# Patient Record
Sex: FEMALE | Race: ASIAN | NOT HISPANIC OR LATINO | Employment: FULL TIME | URBAN - METROPOLITAN AREA
[De-identification: names, ages, dates, MRNs, and addresses within clinical notes are randomized per-mention and may not be internally consistent; named-entity substitution may affect disease eponyms.]

---

## 2017-03-24 ENCOUNTER — OFFICE VISIT (OUTPATIENT)
Dept: CARDIOLOGY | Facility: CLINIC | Age: 37
End: 2017-03-24

## 2017-03-24 VITALS
BODY MASS INDEX: 25.8 KG/M2 | DIASTOLIC BLOOD PRESSURE: 90 MMHG | HEIGHT: 62 IN | HEART RATE: 69 BPM | WEIGHT: 140.2 LBS | SYSTOLIC BLOOD PRESSURE: 128 MMHG

## 2017-03-24 DIAGNOSIS — I49.3 PVC (PREMATURE VENTRICULAR CONTRACTION): ICD-10-CM

## 2017-03-24 DIAGNOSIS — I11.9 HYPERTENSIVE HEART DISEASE WITHOUT HEART FAILURE: Primary | ICD-10-CM

## 2017-03-24 DIAGNOSIS — E78.5 DYSLIPIDEMIA: ICD-10-CM

## 2017-03-24 PROCEDURE — 99213 OFFICE O/P EST LOW 20 MIN: CPT | Performed by: INTERNAL MEDICINE

## 2017-03-24 RX ORDER — LISINOPRIL 5 MG/1
5 TABLET ORAL DAILY
Qty: 90 TABLET | Refills: 3 | Status: SHIPPED | OUTPATIENT
Start: 2017-03-24 | End: 2018-04-16 | Stop reason: SDUPTHER

## 2017-03-24 RX ORDER — ROSUVASTATIN CALCIUM 10 MG/1
10 TABLET, COATED ORAL DAILY
COMMUNITY
End: 2017-10-11

## 2017-03-24 RX ORDER — FLECAINIDE ACETATE 100 MG/1
100 TABLET ORAL 2 TIMES DAILY
Qty: 180 TABLET | Refills: 3 | Status: SHIPPED | OUTPATIENT
Start: 2017-03-24 | End: 2018-03-27 | Stop reason: SDUPTHER

## 2017-03-24 NOTE — PROGRESS NOTES
Encounter Date:03/24/2017      Patient ID: Mitulbuivis Joshua is a 37 y.o. female.    Chief Complaint: PVC's; Hypertensive heart disease; and Hyperlipidemia    PROBLEM LIST:  1. Premature ventricular contractions:   a. History of palpitations with cardiac evaluation during pregnancy in 2001 or 2002 while in [facility]. Reportedly, she was seen by some electrophysiologist and ablation procedure was recommended, but declined by patient. Termination of pregnancy was also recommended, but declined by patient. She subsequently had a normal full-term pregnancy and delivery.   b. Hospitalized at [facility], 10/17/2013, with 1-2 week history of dizziness and near syncope, noted to have runs of wide QRS complex tachycardia felt to be left ventricular outflow tract VT.   c. After trial of beta blockers and Cardizem as well as lidocaine, she was discharged on flecainide.   d. Echocardiogram during her pregnancy in 2001 or 2002 was reportedly unremarkable.   e. Echocardiogram at [facility], 10/18/2013, showed normal left ventricular size and function. Ejection fraction 55% to 60%. No significant valvular abnormalities.   f. Beta blockers added by her cardiologist while she was in [place].   g. Echocardiogram, 02/22/2016, showed normal LV systolic function with ejection fraction greater than 65%. Trace mitral and trace tricuspid regurgitation.  h. A 24-hour Holter 01/29/2016 showed sinus rhythm with very frequent PVCs (12% of QRS complexes).  i. Treadmill stress test, 02/22/2016: Remarkable for average exercise capacity, normal hemodynamic response. Negative test for angina or ischemic ST segment changes. No exercise-induced arrhythmias although frequent PVCs were noted at rest which improved with exercise.  j. Ep study  With identification  of 2 PVCs (posterior Left ventricular wall blow mitral valve annulus- associated with NSVT, successfully ablated) second PVC felt to be deep seated and would  "potentially require epicardial ablation.   k. 24-hour Holter September 21., 2016 showed frequent PVCs (4.9%) plaque copy  2. Anxiety and panic disorder.   3. Chronic back pain.   4. \"Hypertensive heart disease\" as noted in her records. No documentation available.   5. Family history of coronary artery disease, father has had stents.   6. Dyslipidemia.   7. Glucose intolerance.     History of Present Illness   Patient is a pleasant lady returns today for follow-up accompanied by relative. Recently him back from [place], notes that she experiences occasional palpitations before starting her medications these are brief rare and self-limiting.Overall stable from cardiac standpoint. Inquires whether she will be able to handle a pregnancy from a medical standpoint. Denies chest pain, shortness of breath, palpitations and syncope. Continues to remain busy and active but shows concern as she does not know what her limits are.    No Known Allergies      Current Outpatient Prescriptions:   •  aspirin 81 MG EC tablet, Take 81 mg by mouth daily., Disp: , Rfl:   •  cholecalciferol (VITAMIN D3) 24778 UNITS capsule, Take 50,000 Units by mouth 1 (one) time per week., Disp: , Rfl:   •  flecainide (TAMBOCOR) 100 MG tablet, Take 1 tablet by mouth 2 (two) times a day., Disp: 180 tablet, Rfl: 2  •  lisinopril (PRINIVIL,ZESTRIL) 5 MG tablet, Take 1 tablet by mouth daily., Disp: 90 tablet, Rfl: 2  •  rosuvastatin (CRESTOR) 10 MG tablet, Take 10 mg by mouth Daily., Disp: , Rfl:     The following portions of the patient's history were reviewed and updated as appropriate: allergies, current medications, past family history, past medical history, past social history, past surgical history and problem list.    Review of Systems   Constitution: Negative for chills, fever, weight gain and weight loss.   Cardiovascular: Negative for chest pain, claudication, dyspnea on exertion, leg swelling, orthopnea, palpitations, paroxysmal nocturnal dyspnea " "and syncope.        No dizziness   Gastrointestinal: Negative for abdominal pain, constipation, diarrhea, nausea and vomiting.   Genitourinary:        No urinary symptoms   Neurological:        No symptoms of stroke.   All other systems reviewed and are negative.    Objective:     Blood pressure 128/90, pulse 69, height 62\" (157.5 cm), weight 140 lb 3.2 oz (63.6 kg).      Physical Exam   Constitutional: She appears well-developed and well-nourished.   HENT:   HEENT exam unremarkable.   Neck: Neck supple. No JVD present.   No carotid bruits.   Cardiovascular: Normal rate, regular rhythm and normal heart sounds.    No murmur heard.  2 plus symmetric pulses.   Pulmonary/Chest: Breath sounds normal. She exhibits no tenderness.   Abdominal:   Abdomen benign.   Musculoskeletal: She exhibits no edema.   Neurological:   Neurological exam unremarkable.   Vitals reviewed.    Procedures       Assessment:      Diagnosis Plan   1. Hypertensive heart disease without heart failure     2. PVC (premature ventricular contraction)     3. Dyslipidemia       Plan:   Overall stable from a cardiac standpoint.  Regarding her pregnancy planning I have advised her to discuss this further with her PCP and OB/GYN, there is no obvious contraindication from cardiac standpoint however this may necessitate discontinuation of cardiac and blood pressure medications with subsequent recurrence of symptoms and complications.    Continue current medications.   FU in 12 MO, sooner as needed.  Thank you for allowing us to participate in the care of your patient.     Scribed for Joesph Flower MD by Dudley Zhou. 3/24/2017  12:54 PM    Joseph GAUTAM MD, personally performed the services described in this documentation as scribed by the above named individual in my presence, and it is both accurate and complete.  3/24/2017  3:50 PM        Please note that portions of this note may have been completed with a voice recognition program. Efforts were made to " edit the dictations, but occasionally words are mistranscribed.

## 2017-04-05 ENCOUNTER — OFFICE VISIT (OUTPATIENT)
Dept: CARDIOLOGY | Facility: CLINIC | Age: 37
End: 2017-04-05

## 2017-04-05 VITALS
HEART RATE: 72 BPM | SYSTOLIC BLOOD PRESSURE: 122 MMHG | HEIGHT: 62 IN | BODY MASS INDEX: 26.06 KG/M2 | DIASTOLIC BLOOD PRESSURE: 78 MMHG | WEIGHT: 141.6 LBS

## 2017-04-05 DIAGNOSIS — I11.9 HYPERTENSIVE HEART DISEASE WITHOUT HEART FAILURE: ICD-10-CM

## 2017-04-05 DIAGNOSIS — I49.3 PVC (PREMATURE VENTRICULAR CONTRACTION): Primary | ICD-10-CM

## 2017-04-05 PROCEDURE — 99213 OFFICE O/P EST LOW 20 MIN: CPT | Performed by: INTERNAL MEDICINE

## 2017-04-05 NOTE — PROGRESS NOTES
Khushbukumari Josiane  1980  [phone]      04/05/2017    [facility] CARDIOLOGY     Bashir Das MD  [address] [place] [address]    Chief Complaint   Patient presents with   • Palpitations       Problem List:   PROBLEM LIST:  1. Premature ventricular contractions:   a. History of palpitations with cardiac evaluation during pregnancy in 2001 or 2002 while in [facility]. Reportedly, she was seen by some electrophysiologist and ablation procedure was recommended, but declined by patient. Termination of pregnancy was also recommended, but declined by patient. She subsequently had a normal full-term pregnancy and delivery.   b. Hospitalized at [facility], 10/17/2013, with 1-2 week history of dizziness and near syncope, noted to have runs of wide QRS complex tachycardia felt to be left ventricular outflow tract VT.   c. After trial of beta blockers and Cardizem as well as lidocaine, she was discharged on flecainide.   d. Echocardiogram during her pregnancy in 2001 or 2002 was reportedly unremarkable.   e. Echocardiogram at [facility], 10/18/2013, showed normal left ventricular size and function. Ejection fraction 55% to 60%. No significant valvular abnormalities.   f. Beta blockers added by her cardiologist while she was in [place].   g. Echocardiogram, 02/22/2016, showed normal LV systolic function with ejection fraction greater than 65%. Trace mitral and trace tricuspid regurgitation.  h. A 24-hour Holter 01/29/2016 showed sinus rhythm with very frequent PVCs (12% of QRS complexes).  i. Treadmill stress test, 02/22/2016: Remarkable for average exercise capacity, normal hemodynamic response. Negative test for angina or ischemic ST segment changes. No exercise-induced arrhythmias although frequent PVCs were noted at rest which improved with exercise.  j. Ep study With identification  of 2 PVCs (posterior Left ventricular wall blow mitral valve  "annulus- associated with NSVT, successfully ablated) second PVC felt to be deep seated and would potentially require epicardial ablation.   2. Anxiety and panic disorder.   3. Chronic back pain.   4. \"Hypertensive heart disease\" as noted in her records. No documentation available.   5. Family history of coronary artery disease, father has had stents.   6. Dyslipidemia.   7. Glucose intolerance.   Allergies  No Known Allergies    Current Medications    Current Outpatient Prescriptions:   •  aspirin 81 MG EC tablet, Take 81 mg by mouth daily., Disp: , Rfl:   •  cholecalciferol (VITAMIN D3) 28137 UNITS capsule, Take 50,000 Units by mouth 1 (one) time per week., Disp: , Rfl:   •  flecainide (TAMBOCOR) 100 MG tablet, Take 1 tablet by mouth 2 (Two) Times a Day., Disp: 180 tablet, Rfl: 3  •  lisinopril (PRINIVIL,ZESTRIL) 5 MG tablet, Take 1 tablet by mouth Daily., Disp: 90 tablet, Rfl: 3  •  rosuvastatin (CRESTOR) 10 MG tablet, Take 10 mg by mouth Daily., Disp: , Rfl:     History of Present Illness   HPI    Pt presents for follow up of PVC's . Since we last saw the pt, pt denies any palps, SOB, CP, LH, and dizziness. Denies any hospitalizations, ER visits, bleeding, or TIA/CVA symptoms. Overall feels well. They are thinking about getting pregnant, not sure however. Saw Dr Flower last month with NL EKG    ROS:  General:  Denies fatigue, weight gain or loss  Cardiovascular:  Denies CP, PND, syncope, near syncope, edema or palpitations.  Pulmonary:  Denies HOLLAND, cough, or wheezing      Vitals:    04/05/17 1449   BP: 122/78   BP Location: Right arm   Patient Position: Sitting   Pulse: 72   Weight: 141 lb 9.6 oz (64.2 kg)   Height: 62\" (157.5 cm)     PE:  General: NAD  Neck: no JVD, no carotid bruits, no TM  Heart RRR, NL S1, S2, S4 present, no rubs, murmurs  Lungs: CTA, no wheezes, rhonchi, or rales  Abd: soft, non-tender, NL BS  Ext: No musculoskeletal deformities, no edema, cyanosis, or clubbing  Psych: normal mood and " affect    Diagnostic Data:  Procedures    1. PVC (premature ventricular contraction)    2. Hypertensive heart disease without heart failure          Plan:  1)PVCs s/p RFA well controlled on meds: pt to call us if she plans to get pregnant so to adyust her meds.  Continue present medications.   2) HTN  Wt loss, exercise, salt reduction    F/up in 6 months

## 2017-05-11 ENCOUNTER — OFFICE VISIT (OUTPATIENT)
Dept: CARDIOLOGY | Facility: CLINIC | Age: 37
End: 2017-05-11

## 2017-05-11 VITALS
SYSTOLIC BLOOD PRESSURE: 104 MMHG | HEART RATE: 94 BPM | HEIGHT: 62 IN | DIASTOLIC BLOOD PRESSURE: 72 MMHG | WEIGHT: 140 LBS | BODY MASS INDEX: 25.76 KG/M2

## 2017-05-11 DIAGNOSIS — I49.3 PVC (PREMATURE VENTRICULAR CONTRACTION): Primary | ICD-10-CM

## 2017-05-11 DIAGNOSIS — E78.5 DYSLIPIDEMIA: ICD-10-CM

## 2017-05-11 DIAGNOSIS — I11.9 HYPERTENSIVE HEART DISEASE WITHOUT HEART FAILURE: ICD-10-CM

## 2017-05-11 PROCEDURE — 93000 ELECTROCARDIOGRAM COMPLETE: CPT | Performed by: INTERNAL MEDICINE

## 2017-05-11 PROCEDURE — 99213 OFFICE O/P EST LOW 20 MIN: CPT | Performed by: INTERNAL MEDICINE

## 2017-05-11 RX ORDER — ALPRAZOLAM 0.25 MG/1
0.25 TABLET ORAL 2 TIMES DAILY PRN
COMMUNITY
End: 2017-10-11

## 2017-10-11 ENCOUNTER — OFFICE VISIT (OUTPATIENT)
Dept: CARDIOLOGY | Facility: CLINIC | Age: 37
End: 2017-10-11

## 2017-10-11 VITALS
HEIGHT: 62 IN | BODY MASS INDEX: 26.35 KG/M2 | DIASTOLIC BLOOD PRESSURE: 93 MMHG | WEIGHT: 143.2 LBS | SYSTOLIC BLOOD PRESSURE: 138 MMHG | HEART RATE: 71 BPM

## 2017-10-11 DIAGNOSIS — I11.9 HYPERTENSIVE HEART DISEASE WITHOUT HEART FAILURE: ICD-10-CM

## 2017-10-11 DIAGNOSIS — I49.3 PVC (PREMATURE VENTRICULAR CONTRACTION): Primary | ICD-10-CM

## 2017-10-11 PROCEDURE — 93000 ELECTROCARDIOGRAM COMPLETE: CPT | Performed by: PHYSICIAN ASSISTANT

## 2017-10-11 PROCEDURE — 99213 OFFICE O/P EST LOW 20 MIN: CPT | Performed by: PHYSICIAN ASSISTANT

## 2017-10-11 NOTE — PROGRESS NOTES
Khushbukumari Josiane  1980  [phone]      10/11/2017    [facility] CARDIOLOGY     Bashir Das MD  [address] [place] [address]    Chief Complaint   Patient presents with   • Palpitations       PROBLEM LIST:  1. Premature ventricular contractions:   a. History of palpitations with cardiac evaluation during pregnancy in 2001 or 2002 while in [place]. Reportedly, she was seen by some electrophysiologist and ablation procedure was recommended, but declined by patient. Termination of pregnancy was also recommended, but declined by patient. She subsequently had a normal full-term pregnancy and delivery.   b. Hospitalized at [facility], 10/17/2013, with 1-2 week history of dizziness and near syncope, noted to have runs of wide QRS complex tachycardia felt to be left ventricular outflow tract VT.   c. After trial of beta blockers and Cardizem as well as lidocaine, she was discharged on flecainide.   d. Echocardiogram during her pregnancy in 2001 or 2002 was reportedly unremarkable.   e. Echocardiogram at [facility], 10/18/2013, showed normal left ventricular size and function. Ejection fraction 55% to 60%. No significant valvular abnormalities.   f. Beta blockers added by her cardiologist while she was in [place].   g. Echocardiogram, 02/22/2016, showed normal LV systolic function with ejection fraction greater than 65%. Trace mitral and trace tricuspid regurgitation.  h. A 24-hour Holter 01/29/2016 showed sinus rhythm with very frequent PVCs (12% of QRS complexes).  i. Treadmill stress test, 02/22/2016: Remarkable for average exercise capacity, normal hemodynamic response. Negative test for angina or ischemic ST segment changes. No exercise-induced arrhythmias although frequent PVCs were noted at rest which improved with exercise.  j. Ep study With identification  of 2 PVCs (posterior Left ventricular wall blow mitral valve annulus- associated  "with NSVT, successfully ablated) second PVC felt to be deep seated and would potentially require epicardial ablation.   2. Anxiety and panic disorder.   3. Chronic back pain.   4. \"Hypertensive heart disease\" as noted in her records. No documentation available.   5. Family history of coronary artery disease, father has had stents.   6. Dyslipidemia.   7. Glucose intolerance.     Allergies  No Known Allergies    Current Medications    Current Outpatient Prescriptions:   •  aspirin 81 MG EC tablet, Take 81 mg by mouth daily., Disp: , Rfl:   •  flecainide (TAMBOCOR) 100 MG tablet, Take 1 tablet by mouth 2 (Two) Times a Day., Disp: 180 tablet, Rfl: 3  •  lisinopril (PRINIVIL,ZESTRIL) 5 MG tablet, Take 1 tablet by mouth Daily., Disp: 90 tablet, Rfl: 3    History of Present Illness   HPI    Pt presents for follow up of PVCs . Since we last saw the pt, she had an episode of tachycardia and went to the ER in [place] and was told she had anxiety. She states it was similar to what she felt prior to her ablation. She was given Xanax while there. Since then, she has been doing well. Her HRs have been normal since then, her BPs have been good. She denies CP, SOB, syncope, and palpitations.     ROS:  General:  Denies fatigue, weight gain or loss  Cardiovascular:  Denies CP, PND, syncope, near syncope, edema + palpitations.  Pulmonary:  Denies HOLLAND, cough, or wheezing      Vitals:    10/11/17 1449   BP: 138/93   BP Location: Left arm   Patient Position: Sitting   Pulse: 71   Weight: 143 lb 3.2 oz (65 kg)   Height: 62\" (157.5 cm)     PE:  General: NAD A & O x 3  Neck: no JVD, no carotid bruits, no TM  Heart RRR, NL S1, S2, S4 present, no rubs, + MR murmur  Lungs: CTA, no wheezes, rhonchi, or rales  Abd: soft, non-tender, NL BS  Ext: No musculoskeletal deformities, no edema, cyanosis, or clubbing  Psych: normal mood and affect    Diagnostic Data:        ECG 12 Lead  Date/Time: 10/11/2017 3:08 PM  Performed by: OG ABEL " M  Authorized by: OG ABEL   Rhythm: sinus rhythm  BPM: 71              1. PVC (premature ventricular contraction)    2. Hypertensive heart disease without heart failure          Plan:  1. PVCs- overall controlled on Flecainide. Had two episode of tachycardia several months ago, now resolved. If more frequent, consider event monitor. She can also get EKG for another episode.   2. HTN - controlled.     F/up in 6 months    Og Abel PA-C  [facility]  10/11/2017   3:27 PM  I saw this patient independently.

## 2018-02-09 ENCOUNTER — TELEPHONE (OUTPATIENT)
Dept: CARDIOLOGY | Facility: CLINIC | Age: 38
End: 2018-02-09

## 2018-02-09 NOTE — TELEPHONE ENCOUNTER
Patient states she has been experiencing increased anxiety at night, and she has noticed her BP was elevated at that time.  She reports systolic increase to 150 during anxiety episode.  She did not monitor HR.  Patient took a dose of PRN Xanax and extra 5mg Lisinopril and reports BP lowered.    Patient denies CP, SOA, palpitations.    I advised patient to see PCP for further management of her anxiety.  Continue to use PRN Xanax.  She may take Melatonin 3mg nightly to help her sleep.  Advised patient to keep a BP/HR log for one week and call me with results.  I advised patient that she may take an extra 5mg of lisinopril at night if systolic BP is greater than 180.    Patient verbalized understanding.

## 2018-02-16 NOTE — TELEPHONE ENCOUNTER
Patient called back in to report BP log.  BP in the 120-130/80's range.  She was started on Lexapro by her PCP, and given amitriptyline to help her sleep.    Patient reports she is doing better, and sleeping better.    She will call back if necessary.

## 2018-03-27 RX ORDER — FLECAINIDE ACETATE 100 MG/1
TABLET ORAL
Qty: 60 TABLET | Refills: 2 | Status: SHIPPED | OUTPATIENT
Start: 2018-03-27 | End: 2018-04-16 | Stop reason: SDUPTHER

## 2018-04-16 ENCOUNTER — OFFICE VISIT (OUTPATIENT)
Dept: CARDIOLOGY | Facility: CLINIC | Age: 38
End: 2018-04-16

## 2018-04-16 VITALS
HEART RATE: 80 BPM | HEIGHT: 62 IN | SYSTOLIC BLOOD PRESSURE: 140 MMHG | DIASTOLIC BLOOD PRESSURE: 98 MMHG | BODY MASS INDEX: 26.87 KG/M2 | WEIGHT: 146 LBS

## 2018-04-16 DIAGNOSIS — I49.3 PVC (PREMATURE VENTRICULAR CONTRACTION): Primary | ICD-10-CM

## 2018-04-16 DIAGNOSIS — E78.5 DYSLIPIDEMIA: ICD-10-CM

## 2018-04-16 PROCEDURE — 93000 ELECTROCARDIOGRAM COMPLETE: CPT | Performed by: INTERNAL MEDICINE

## 2018-04-16 PROCEDURE — 99213 OFFICE O/P EST LOW 20 MIN: CPT | Performed by: INTERNAL MEDICINE

## 2018-04-16 RX ORDER — AMITRIPTYLINE HYDROCHLORIDE 25 MG/1
25 TABLET, FILM COATED ORAL NIGHTLY
COMMUNITY
End: 2018-05-16

## 2018-04-16 RX ORDER — LISINOPRIL 5 MG/1
5 TABLET ORAL DAILY
Qty: 30 TABLET | Refills: 11 | Status: SHIPPED | OUTPATIENT
Start: 2018-04-16 | End: 2018-04-16 | Stop reason: SDUPTHER

## 2018-04-16 RX ORDER — FLECAINIDE ACETATE 100 MG/1
100 TABLET ORAL 2 TIMES DAILY
Qty: 60 TABLET | Refills: 11 | Status: SHIPPED | OUTPATIENT
Start: 2018-04-16 | End: 2019-01-16 | Stop reason: SDUPTHER

## 2018-04-16 RX ORDER — ESCITALOPRAM OXALATE 10 MG/1
10 TABLET ORAL DAILY
COMMUNITY
End: 2018-04-16 | Stop reason: SDUPTHER

## 2018-04-16 RX ORDER — ROSUVASTATIN CALCIUM 10 MG/1
10 TABLET, COATED ORAL DAILY
COMMUNITY
End: 2019-01-16

## 2018-04-16 RX ORDER — LISINOPRIL 5 MG/1
5 TABLET ORAL DAILY
Qty: 30 TABLET | Refills: 11 | Status: SHIPPED | OUTPATIENT
Start: 2018-04-16

## 2018-04-16 RX ORDER — FLECAINIDE ACETATE 100 MG/1
100 TABLET ORAL 2 TIMES DAILY
Qty: 60 TABLET | Refills: 11 | Status: SHIPPED | OUTPATIENT
Start: 2018-04-16 | End: 2018-04-16 | Stop reason: SDUPTHER

## 2018-04-16 RX ORDER — ESCITALOPRAM OXALATE 10 MG/1
10 TABLET ORAL DAILY
Qty: 30 TABLET | Refills: 6 | Status: SHIPPED | OUTPATIENT
Start: 2018-04-16 | End: 2021-10-25 | Stop reason: ALTCHOICE

## 2018-04-16 NOTE — PROGRESS NOTES
Encounter Date:04/16/2018      Patient ID: Ruby Joshua is a 38 y.o. female.    Chief Complaint: pvc      PROBLEM LIST:  1. Premature ventricular contractions:   a. History of palpitations with cardiac evaluation during pregnancy in 2001 or 2002 while in [facility]. Reportedly, zara was seen by some electrophysiologist and ablation procedure was recommended, but declined by patient. Termination of pregnancy was also recommended, but declined by patient. She subsequently had a normal full-term pregnancy and delivery.   [facility], with 1-2 week history of dizziness and near syncope, noted to have runs of wide QRS complex tachycardia felt to be left ventricular outflow tract VT.   c. After trial of beta blockers and Cardizem as well as lidocaine, she was discharged on flecainide.   d. Echocardiogram during her pregnancy in 2001 or 2002 was reportedly unremarkable.   e. [facility], 10/18/2013, showed normal left ventricular size and function. Ejection fraction 55% to 60%. No significant valvular abnormalities.   f. Beta blockers added by her cardiologist while she was in [place].   g. Echocardiogram, 02/22/2016, showed normal LV systolic function with ejection fraction greater than 65%. Trace mitral and trace tricuspid regurgitation.  h. A 24-hour Holter 01/29/2016 showed sinus rhythm with very frequent PVCs (12% of QRS complexes).  i. Treadmill stress test, 02/22/2016: Remarkable for average exercise capacity, normal hemodynamic response. Negative test for angina or ischemic ST segment changes. No exercise-induced arrhythmias although frequent PVCs were noted at rest which improved with exercise.  j. Ep study With identification  of 2 PVCs (posterior Left ventricular wall blow mitral valve annulus- associated with NSVT, successfully ablated) second PVC felt to be deep seated and would potentially require epicardial ablation.  "  2. Anxiety and panic disorder.   3. Chronic back pain.   4. \"Hypertensive heart disease\" as noted in her records. No documentation available.   5. Family history of coronary artery disease, father has had stents.   6. Dyslipidemia.   7. Glucose intolerance.     History of Present Illness  Patient presents today for follow-up with a history of premature ventricular contractions and cardiac risk factors. Since last visit has been doing well form a cardiac standpoint, states that her anxiety symptoms have remarkably improved however she ran out of Lexapro a few days ago and is starting to feel a mild recurrence.  States that she sometimes wakes up with a jerky sensation however overall feels much better. No recent palpitations.  Denies dizziness lightheadedness chest pain shortness of breath or any other symptoms.  Tries to exercise but quits when she gets tired as she is afraid to stress her heart.  No Known Allergies      Current Outpatient Prescriptions:   •  amitriptyline (ELAVIL) 25 MG tablet, Take 25 mg by mouth Every Night., Disp: , Rfl:   •  aspirin 81 MG EC tablet, Take 81 mg by mouth daily., Disp: , Rfl:   •  escitalopram (LEXAPRO) 10 MG tablet, Take 1 tablet by mouth Daily., Disp: 30 tablet, Rfl: 6  •  flecainide (TAMBOCOR) 100 MG tablet, Take 1 tablet by mouth 2 (Two) Times a Day., Disp: 60 tablet, Rfl: 11  •  lisinopril (PRINIVIL,ZESTRIL) 5 MG tablet, Take 1 tablet by mouth Daily., Disp: 30 tablet, Rfl: 11  •  rosuvastatin (CRESTOR) 10 MG tablet, Take 10 mg by mouth Daily., Disp: , Rfl:     The following portions of the patient's history were reviewed and updated as appropriate: allergies, current medications, past family history, past medical history, past social history, past surgical history and problem list.    ROS  Review of Systems   Constitution: Negative for chills, fever, weight gain and weight loss.   Cardiovascular: Negative for chest pain, claudication, dyspnea on exertion, leg swelling, " "orthopnea, palpitations, paroxysmal nocturnal dyspnea and syncope.        No dizziness   Gastrointestinal: Negative for abdominal pain, constipation, diarrhea, nausea and vomiting.   Genitourinary:        No urinary symptoms   Neurological:        No symptoms of stroke.   All other systems reviewed and are negative.    Objective:     Blood pressure 140/98, pulse 80, height 157.5 cm (62\"), weight 66.2 kg (146 lb).      Physical Exam  Constitutional: She appears well-developed and well-nourished.   HENT:   HEENT exam unremarkable.   Neck: Neck supple. No JVD present.   No carotid bruits.   Cardiovascular: Normal rate, regular rhythm and normal heart sounds.    No murmur heard.  2 plus symmetric pulses.   Pulmonary/Chest: Breath sounds normal. Does not exhibit tenderness.   Abdominal:   Abdomen benign.   Musculoskeletal: Does not exhibit edema.   Neurological:   Neurological exam unremarkable.   Vitals reviewed.    Lab Review:     ECG 12 Lead  Date/Time: 4/16/2018 12:24 PM  Performed by: RENETTA SHARPE  Authorized by: RENETTA SHARPE   Comparison: compared with previous ECG from 11/10/2017  Similar to previous ECG  Rhythm: sinus rhythm  Clinical impression: normal ECG  Comments: Normal QT interval.               Assessment:      Diagnosis Plan   1. PVC (premature ventricular contraction)  Status post ablation, well controlled on flecainide with normal QT interval.     2. Dyslipidemia       Plan:   Recommend that she should continue her Lexapro, I refilled it at her request.  Continue rest of the current medications.  Pressure was elevated on today's visit but is averaging within normal limits, continue to monitor, restrict salt/sodium.  Advised that she can exercise regularly and it is okay to exert beyond usual limits for better conditioning.  Monitor caffeine intake.  Continue current medications.   FU in 12 MO, sooner as needed.  Thank you for allowing us to participate in the care of your patient.     Scribed for Renetta " MD Mundo by Chad Zhou. 4/16/2018  12:08 PM    I, Joseph Flower MD, personally performed the services described in this documentation as scribed by the above named individual in my presence, and it is both accurate and complete.  4/16/2018  12:21 PM        Please note that portions of this note may have been completed with a voice recognition program. Efforts were made to edit the dictations, but occasionally words are mistranscribed.

## 2018-05-16 ENCOUNTER — OFFICE VISIT (OUTPATIENT)
Dept: CARDIOLOGY | Facility: CLINIC | Age: 38
End: 2018-05-16

## 2018-05-16 VITALS
HEIGHT: 62 IN | BODY MASS INDEX: 26.83 KG/M2 | WEIGHT: 145.8 LBS | SYSTOLIC BLOOD PRESSURE: 124 MMHG | HEART RATE: 94 BPM | DIASTOLIC BLOOD PRESSURE: 78 MMHG

## 2018-05-16 DIAGNOSIS — I49.3 PVC (PREMATURE VENTRICULAR CONTRACTION): Primary | ICD-10-CM

## 2018-05-16 DIAGNOSIS — I11.9 HYPERTENSIVE HEART DISEASE WITHOUT HEART FAILURE: ICD-10-CM

## 2018-05-16 PROCEDURE — 99213 OFFICE O/P EST LOW 20 MIN: CPT | Performed by: INTERNAL MEDICINE

## 2018-05-16 PROCEDURE — 93000 ELECTROCARDIOGRAM COMPLETE: CPT | Performed by: INTERNAL MEDICINE

## 2018-05-16 NOTE — PROGRESS NOTES
Khushbukumari Josiane  1980  [phone]      05/16/2018    [facility] CARDIOLOGY     Bashir Das MD  [address] [place] [address]    Chief Complaint   Patient presents with   • Palpitations       Problem List:   1. Premature ventricular contractions:   a. History of palpitations with cardiac evaluation during pregnancy in 2001 or 2002 while in [place]. Reportedly, she was seen by some electrophysiologist and ablation procedure was recommended, but declined by patient. Termination of pregnancy was also recommended, but declined by patient. She subsequently had a normal full-term pregnancy and delivery.   b. Hospitalized at [facility], 10/17/2013, with 1-2 week history of dizziness and near syncope, noted to have runs of wide QRS complex tachycardia felt to be left ventricular outflow tract VT.   c. After trial of beta blockers and Cardizem as well as lidocaine, she was discharged on flecainide.   d. Echocardiogram during her pregnancy in 2001 or 2002 was reportedly unremarkable.   e. Echocardiogram at [facility], 10/18/2013, showed normal left ventricular size and function. Ejection fraction 55% to 60%. No significant valvular abnormalities.   f. Beta blockers added by her cardiologist while she was in [place].   g. Echocardiogram, 02/22/2016, showed normal LV systolic function with ejection fraction greater than 65%. Trace mitral and trace tricuspid regurgitation.  h. A 24-hour Holter 01/29/2016 showed sinus rhythm with very frequent PVCs (12% of QRS complexes).  i. Treadmill stress test, 02/22/2016: Remarkable for average exercise capacity, normal hemodynamic response. Negative test for angina or ischemic ST segment changes. No exercise-induced arrhythmias although frequent PVCs were noted at rest which improved with exercise.  j. Ep study With identification  of 2 PVCs (posterior Left ventricular wall blow mitral valve annulus-  "associated with NSVT, successfully ablated) second PVC felt to be deep seated and would potentially require epicardial ablation.   2. Anxiety and panic disorder.   3. Chronic back pain.   4. \"Hypertensive heart disease\" as noted in her records. No documentation available.   5. Family history of coronary artery disease, father has had stents.   6. Dyslipidemia.   7. Glucose intolerance.     Allergies  No Known Allergies    Current Medications    Current Outpatient Prescriptions:   •  aspirin 81 MG EC tablet, Take 81 mg by mouth daily., Disp: , Rfl:   •  escitalopram (LEXAPRO) 10 MG tablet, Take 1 tablet by mouth Daily., Disp: 30 tablet, Rfl: 6  •  flecainide (TAMBOCOR) 100 MG tablet, Take 1 tablet by mouth 2 (Two) Times a Day., Disp: 60 tablet, Rfl: 11  •  lisinopril (PRINIVIL,ZESTRIL) 5 MG tablet, Take 1 tablet by mouth Daily., Disp: 30 tablet, Rfl: 11  •  rosuvastatin (CRESTOR) 10 MG tablet, Take 10 mg by mouth Daily., Disp: , Rfl:     History of Present Illness   HPI    Pt presents for follow up of PVC's. Her PVC's have overall been well controlled on flecainide.  Occasionally notes more PVC's when she has anxiety or stress.  This is well controlled on her Lexapro.  Since we last saw the pt, she denies any c/o SOB, CP, LH, and dizziness. Denies any hospitalizations, ER visits, bleeding, or TIA/CVA symptoms. Overall feels well.  Does not routinely check blood pressures at home.      ROS:  General:  Denies fatigue, weight gain or loss  Cardiovascular:  Denies CP, PND, syncope, near syncope, edema or palpitations.  Pulmonary:  Denies HOLLAND, cough, or wheezing      Vitals:    05/16/18 1500   BP: 124/78   BP Location: Right arm   Patient Position: Sitting   Pulse: 94   Weight: 66.1 kg (145 lb 12.8 oz)   Height: 157.5 cm (62\")     PE:  General: NAD  Neck: no JVD, no carotid bruits, no TM  Heart RRR, NL S1, S2, no rubs, murmurs  Lungs: CTA, no wheezes, rhonchi, or rales  Abd: soft, non-tender, NL BS  Ext: No " musculoskeletal deformities, no edema, cyanosis, or clubbing  Psych: normal mood and affect    Diagnostic Data:        ECG 12 Lead  Date/Time: 5/16/2018 3:19 PM  Performed by: AMILCAR KELLY  Authorized by: AMILCAR KELLY   Comparison: compared with previous ECG from 4/16/2018  Similar to previous ECG  Rhythm: sinus rhythm  BPM: 72              1. PVC (premature ventricular contraction)    2. Hypertensive heart disease without heart failure          Plan:  1) PVC's:  - Overall well controlled on flecainide 75 mg BID.  - Continue present medications.   3) HTN:  - Well controlled on lisinopril  - Wt loss, exercise, salt reduction    F/up in 8 months    Scribed for Amilcar Kelly MD by BROOKLYN Iglesias. 5/16/2018  3:32 PM     Amilcar GAUTAM MD, personally performed the services described in this documentation as scribed by the above named individual in my presence, and it is both accurate and complete.  5/16/2018  3:32 PM

## 2019-01-16 ENCOUNTER — OFFICE VISIT (OUTPATIENT)
Dept: CARDIOLOGY | Facility: CLINIC | Age: 39
End: 2019-01-16

## 2019-01-16 VITALS
WEIGHT: 146.4 LBS | BODY MASS INDEX: 26.94 KG/M2 | OXYGEN SATURATION: 99 % | HEIGHT: 62 IN | HEART RATE: 81 BPM | SYSTOLIC BLOOD PRESSURE: 122 MMHG | DIASTOLIC BLOOD PRESSURE: 82 MMHG

## 2019-01-16 DIAGNOSIS — I49.3 PVC (PREMATURE VENTRICULAR CONTRACTION): ICD-10-CM

## 2019-01-16 DIAGNOSIS — I50.30 HYPERTENSIVE HEART DISEASE WITH DIASTOLIC CONGESTIVE HEART FAILURE, UNSPECIFIED HF CHRONICITY (HCC): Primary | ICD-10-CM

## 2019-01-16 DIAGNOSIS — I11.0 HYPERTENSIVE HEART DISEASE WITH DIASTOLIC CONGESTIVE HEART FAILURE, UNSPECIFIED HF CHRONICITY (HCC): Primary | ICD-10-CM

## 2019-01-16 PROCEDURE — 99214 OFFICE O/P EST MOD 30 MIN: CPT | Performed by: PHYSICIAN ASSISTANT

## 2019-01-16 PROCEDURE — 93000 ELECTROCARDIOGRAM COMPLETE: CPT | Performed by: PHYSICIAN ASSISTANT

## 2019-01-16 RX ORDER — FLECAINIDE ACETATE 100 MG/1
100 TABLET ORAL 2 TIMES DAILY
Qty: 60 TABLET | Refills: 11 | Status: SHIPPED | OUTPATIENT
Start: 2019-01-16 | End: 2020-10-16 | Stop reason: SDUPTHER

## 2019-01-17 NOTE — PROGRESS NOTES
[facility]   OFFICE NOTE      Ruby Joshua  1980  PCP: Bashir Das MD    SUBJECTIVE:   Ruby Joshua is a 38 y.o. female seen for a follow up visit regarding the following: PVC's,HTN, Tambocor, Mild Mild MR    CC:Palpitations  Problem List:   1. Premature ventricular contractions:   a. History of palpitations with cardiac evaluation during pregnancy in 2001 or 2002 while in [facility]. Reportedly, she was seen by some electrophysiologist and ablation procedure was recommended, but declined by patient. Termination of pregnancy was also recommended, but declined by patient. She subsequently had a normal full-term pregnancy and delivery.   b. Hospitalized at [facility], 10/17/2013, with 1-2 week history of dizziness and near syncope, noted to have runs of wide QRS complex tachycardia felt to be left ventricular outflow tract VT.   c. After trial of beta blockers and Cardizem as well as lidocaine, she was discharged on flecainide.   d. Echocardiogram during her pregnancy in 2001 or 2002 was reportedly unremarkable.   e. Echocardiogram at [facility], 10/18/2013, showed normal left ventricular size and function. Ejection fraction 55% to 60%. No significant valvular abnormalities.   f. Beta blockers added by her cardiologist while she was in [place].   g. Echocardiogram, 02/22/2016, showed normal LV systolic function with ejection fraction greater than 65%. Trace mitral and trace tricuspid regurgitation.  h. A 24-hour Holter 01/29/2016 showed sinus rhythm with very frequent PVCs (12% of QRS complexes).  i. Treadmill stress test, 02/22/2016: Remarkable for average exercise capacity, normal hemodynamic response. Negative test for angina or ischemic ST segment changes. No exercise-induced arrhythmias although frequent PVCs were noted at rest which improved with exercise.  j. Ep study With identification  of 2 PVCs (posterior Left ventricular  "wall blow mitral valve annulus- associated with NSVT, successfully ablated) second PVC felt to be deep seated and would potentially require epicardial ablation.   2. Anxiety and panic disorder.   3. Chronic back pain.   4. \"Hypertensive heart disease\" as noted in her records. No documentation available.   5. Family history of coronary artery disease, father has had stents.   6. Dyslipidemia.   7. Glucose intolerance.          HPI:   The patient is a pleasant 38-year-old female seen in follow-up regarding history of palpitations, PVCs.  She states that since taking Tambocor her symptoms of palpitations have greatly improved.   She states she's taking Zestril on her blood pressures controlled.  She is also recently started metformin for diabetes type 2.  She denies any dizziness, near-syncope or syncope.  She denies any chest pain.  She denies any heart Thursday symptoms such as orthopnea, peripheral edema, or edema.  She's had some peripheral neuropathy in her right leg with evaluation with her PCP including an ultrasound that was negative.        ROS:  Review of Symptoms:  General: no recent weight loss/gain, weakness or fatigue  Skin: no rashes, lumps, or other skin changes  HEENT: no dizziness, lightheadedness, or vision changes  Respiratory: no cough or hemoptysis  Cardiovascular: no palpitations, and tachycardia  Gastrointestinal: no black/tarry stools or diarrhea  Urinary: no change in frequency or urgency  Peripheral Vascular: no claudication or leg cramps  Musculoskeletal: Right leg peripheral neuropathy  Psychiatric: no depression or excessive stress  Neurological: no sensory or motor loss, no syncope  Hematologic: no anemia, easy bruising or bleeding  Endocrine: no thyroid problems, nor heat or cold intolerance    Cardiac PMH: (Old records have been reviewed and summarized below)      Past Medical History, Past Surgical History, Family history, Social History, and Medications were all reviewed with the " "patient today and updated as necessary.       Current Outpatient Medications:   •  aspirin 81 MG EC tablet, Take 81 mg by mouth daily., Disp: , Rfl:   •  escitalopram (LEXAPRO) 10 MG tablet, Take 1 tablet by mouth Daily., Disp: 30 tablet, Rfl: 6  •  flecainide (TAMBOCOR) 100 MG tablet, Take 1 tablet by mouth 2 (Two) Times a Day., Disp: 60 tablet, Rfl: 11  •  lisinopril (PRINIVIL,ZESTRIL) 5 MG tablet, Take 1 tablet by mouth Daily., Disp: 30 tablet, Rfl: 11  •  metFORMIN (GLUCOPHAGE) 500 MG tablet, Take 500 mg by mouth Daily With Breakfast., Disp: , Rfl:       No Known Allergies  Patient Active Problem List   Diagnosis   • Cardiac arrhythmia   • Anxiety disorder   • Chronic back pain   • Hypertensive heart disease   • Dyslipidemia   • Gluten intolerance   • PVC (premature ventricular contraction)     Past Medical History:   Diagnosis Date   • Anxiety disorder     and panic   • Cardiac arrhythmia    • Chronic back pain    • Dyslipidemia    • Gluten intolerance    • Hypertensive heart disease    • PVC (premature ventricular contraction)     Very frequent PVCs, often symptomatic with additional component of significant anxiety.      History reviewed. No pertinent surgical history.  Family History   Problem Relation Age of Onset   • Heart disease Father         CAD -stents   • Heart disease Other         CAD   • No Known Problems Mother      Social History     Tobacco Use   • Smoking status: Never Smoker   • Smokeless tobacco: Never Used   Substance Use Topics   • Alcohol use: No         PHYSICAL EXAM:    /82 (BP Location: Left arm, Patient Position: Sitting)   Pulse 81   Ht 157.5 cm (62\")   Wt 66.4 kg (146 lb 6.4 oz)   SpO2 99%   BMI 26.78 kg/m²        Wt Readings from Last 5 Encounters:   01/16/19 66.4 kg (146 lb 6.4 oz)   05/16/18 66.1 kg (145 lb 12.8 oz)   04/16/18 66.2 kg (146 lb)   10/11/17 65 kg (143 lb 3.2 oz)   05/11/17 63.5 kg (140 lb)       BP Readings from Last 5 Encounters:   01/16/19 122/82 "   05/16/18 124/78   04/16/18 140/98   10/11/17 138/93   05/11/17 104/72       General appearance - Alert, well appearing, and in no distress   Mental status - Affect appropriate to mood.  Eyes - Sclerae anicteric,  ENMT - Hearing grossly normal bilaterally, Dental hygiene good.  Neck - Carotids upstroke normal bilaterally, no bruits, no JVD.  Resp - Clear to auscultation, no wheezes, rales or rhonchi, symmetric air entry.  Heart - Normal rate, regular rhythm, normal S1, S2, 2/6 systolic murmur. No rubs, clicks or gallops.  GI - Soft, nontender, nondistended, no masses or organomegaly.  Neurological - Grossly intact - normal speech, no focal findings  Musculoskeletal - No joint tenderness, deformity or swelling, no muscular tenderness noted.  Extremities - Peripheral pulses normal, no pedal edema, no clubbing or cyanosis.  Skin - Normal coloration and turgor.  Psych -  oriented to person, place, and time.    Medical problems and test results were reviewed with the patient today.     No results found for this or any previous visit (from the past 672 hour(s)).      EKG: (EKG has been independently visualized by me and summarized below)    ECG 12 Lead  Date/Time: 1/16/2019 7:31 PM  Performed by: Amari Batres PA  Authorized by: Amari Batres PA   Comparison: compared with previous ECG from 5/16/2018  Similar to previous ECG  Rhythm: sinus rhythm  Rate: normal  Conduction: conduction normal  ST Segments: ST segments normal  T Waves: T waves normal  QRS axis: normal  Clinical impression: normal ECG        ASSESSMENT   1. PVC's:  Tambocor.  Well Controlled.  Acceptable EKG.   2. HTN: Controlled on ACE  3. Mild MR and TR, Normal EF by Echocardiogram 2016, Consider follow up Echo in one year.     PLAN  · Continue current medical therapy  · Follow up with Dr. Kelly 6 months   1/16/2019  7:27 PM    Will Gisel BRITO

## 2019-04-19 PROBLEM — I10 ESSENTIAL HYPERTENSION: Status: ACTIVE | Noted: 2019-04-19

## 2019-04-25 RX ORDER — LISINOPRIL 5 MG/1
TABLET ORAL
Qty: 90 TABLET | Refills: 1 | Status: SHIPPED | OUTPATIENT
Start: 2019-04-25 | End: 2019-07-24 | Stop reason: SDUPTHER

## 2019-04-25 RX ORDER — FLECAINIDE ACETATE 100 MG/1
TABLET ORAL
Qty: 60 TABLET | Refills: 11 | OUTPATIENT
Start: 2019-04-25

## 2019-07-24 ENCOUNTER — OFFICE VISIT (OUTPATIENT)
Dept: CARDIOLOGY | Facility: CLINIC | Age: 39
End: 2019-07-24

## 2019-07-24 VITALS
HEART RATE: 82 BPM | BODY MASS INDEX: 25.51 KG/M2 | HEIGHT: 62 IN | DIASTOLIC BLOOD PRESSURE: 84 MMHG | SYSTOLIC BLOOD PRESSURE: 140 MMHG | WEIGHT: 138.6 LBS

## 2019-07-24 DIAGNOSIS — I49.3 PVC (PREMATURE VENTRICULAR CONTRACTION): Primary | ICD-10-CM

## 2019-07-24 DIAGNOSIS — I11.9 HYPERTENSIVE HEART DISEASE WITHOUT HEART FAILURE: ICD-10-CM

## 2019-07-24 PROCEDURE — 99213 OFFICE O/P EST LOW 20 MIN: CPT | Performed by: INTERNAL MEDICINE

## 2019-07-24 PROCEDURE — 93000 ELECTROCARDIOGRAM COMPLETE: CPT | Performed by: INTERNAL MEDICINE

## 2019-07-24 RX ORDER — OMEPRAZOLE 40 MG/1
40 CAPSULE, DELAYED RELEASE ORAL DAILY
COMMUNITY
End: 2020-05-13

## 2019-07-24 RX ORDER — ROSUVASTATIN CALCIUM 20 MG/1
40 TABLET, COATED ORAL DAILY
COMMUNITY
End: 2022-02-02

## 2019-07-24 RX ORDER — FERROUS SULFATE 325(65) MG
325 TABLET ORAL
COMMUNITY
End: 2020-05-13

## 2019-07-24 NOTE — PROGRESS NOTES
"Khushbukumari Josiane  1980  [phone]    07/24/2019    [facility]     Bashir Das MD  [address] [place] [address]    Chief Complaint   Patient presents with   • PVC'S         Problem List:   1. Premature ventricular contractions: .   a. Hospitalized at [facility], 10/17/2013, with 1-2 week history of dizziness and near syncope, noted to have runs of wide QRS complex tachycardia felt to be left ventricular outflow tract VT.   b. Treated with Flecainide.   c. Echocardiogram during her pregnancy in 2001 or 2002 was reportedly unremarkable.   d. Echocardiogram at [facility], 10/18/2013, showed normal left ventricular size and function. Ejection fraction 55% to 60%. No significant valvular abnormalities.   e. Echocardiogram, 02/22/2016, showed normal LV systolic function with ejection fraction greater than 65%. Trace mitral and trace tricuspid regurgitation.  f. A 24-hour Holter 01/29/2016 showed sinus rhythm with very frequent PVCs (12% of QRS complexes).  g. Treadmill stress test, 02/22/2016: Remarkable for average exercise capacity, normal hemodynamic response. Negative test for angina or ischemic ST segment changes. No exercise-induced arrhythmias although frequent PVCs were noted at rest which improved with exercise.  h. Ep study With identification  of 2 PVCs (posterior Left ventricular wall blow mitral valve annulus- associated with NSVT, successfully ablated) second PVC felt to be deep seated and would potentially require epicardial ablation. 6/2/16  2. Anxiety and panic disorder.   3. Chronic back pain.   4. \"Hypertensive heart disease\" as noted in her records. No documentation available.   5. Family history of coronary artery disease, father has had stents.   6. Dyslipidemia.   7.   DM2     Allergies  No Known Allergies    Current Medications    Current Outpatient Medications:   •  aspirin 81 MG EC tablet, Take 81 mg " "by mouth daily., Disp: , Rfl:   •  escitalopram (LEXAPRO) 10 MG tablet, Take 1 tablet by mouth Daily., Disp: 30 tablet, Rfl: 6  •  ferrous sulfate 325 (65 FE) MG tablet, Take 325 mg by mouth Daily With Breakfast., Disp: , Rfl:   •  flecainide (TAMBOCOR) 100 MG tablet, Take 1 tablet by mouth 2 (Two) Times a Day., Disp: 60 tablet, Rfl: 11  •  lisinopril (PRINIVIL,ZESTRIL) 5 MG tablet, Take 1 tablet by mouth Daily., Disp: 30 tablet, Rfl: 11  •  metFORMIN (GLUCOPHAGE) 500 MG tablet, Take 500 mg by mouth 2 (Two) Times a Day With Meals., Disp: , Rfl:   •  omeprazole (priLOSEC) 40 MG capsule, Take 40 mg by mouth Daily., Disp: , Rfl:   •  rosuvastatin (CRESTOR) 20 MG tablet, Take 20 mg by mouth Daily., Disp: , Rfl:     History of Present Illness     Pt presents for follow up of PVCs, HTN,  Since we last saw the pt, pt denies any PVCs, SOB, CP, LH, and dizziness, syncope.  Denies any hospitalizations, ER visits, bleeding, or TIA/CVA symptoms. Overall feels well. BP is not checked at home. She states it was in the 120s at her last PCP appointment.     ROS:  General:  Denies fatigue, weight gain or loss  Cardiovascular:  Denies CP, PND, syncope, near syncope, edema or palpitations.  Pulmonary:  Denies HOLLAND, cough, or wheezing      Vitals:    07/24/19 1408   BP: 140/84   BP Location: Right arm   Patient Position: Sitting   Pulse: 82   Weight: 62.9 kg (138 lb 9.6 oz)   Height: 157.5 cm (62\")     Body mass index is 25.35 kg/m².  PE:  General: NAD  Neck: no JVD, no carotid bruits, no TM  Heart RRR, NL S1, S2, S4 present, no rubs, murmurs  Lungs: CTA, no wheezes, rhonchi, or rales  Abd: soft, non-tender, NL BS  Ext: No musculoskeletal deformities, no edema, cyanosis, or clubbing  Psych: normal mood and affect    Diagnostic Data:        ECG 12 Lead  Date/Time: 7/24/2019 2:18 PM  Performed by: Chance Kelly MD  Authorized by: Chance Kelly MD   Comparison: compared with previous ECG from 1/16/2019  Similar to previous " ECG  Rhythm: sinus rhythm  BPM: 82              1. PVC (premature ventricular contraction)    2. Hypertensive heart disease without heart failure          Plan:  1. PVCs:  - well controlled on Flecainide 75 mg BID. QRS WNL.     2. HTN:  - advised her to check BPs at home with goal 130 systolic mmHg on average.     F/up in 8 months    Scribed for Chance Kelly MD by Carina Granado PA-C. 7/24/2019  2:19 PM     I, Chance Kelly MD, personally performed the services described in this documentation as scribed by the above named individual in my presence, and it is both accurate and complete.  7/24/2019  2:33 PM

## 2019-09-06 ENCOUNTER — OFFICE VISIT (OUTPATIENT)
Dept: CARDIOLOGY | Facility: CLINIC | Age: 39
End: 2019-09-06

## 2019-09-06 VITALS
SYSTOLIC BLOOD PRESSURE: 120 MMHG | HEIGHT: 62 IN | WEIGHT: 141.8 LBS | HEART RATE: 68 BPM | DIASTOLIC BLOOD PRESSURE: 74 MMHG | BODY MASS INDEX: 26.09 KG/M2

## 2019-09-06 DIAGNOSIS — E78.5 DYSLIPIDEMIA: ICD-10-CM

## 2019-09-06 DIAGNOSIS — I10 ESSENTIAL HYPERTENSION: ICD-10-CM

## 2019-09-06 DIAGNOSIS — I49.3 PVC (PREMATURE VENTRICULAR CONTRACTION): Primary | ICD-10-CM

## 2019-09-06 PROCEDURE — 99214 OFFICE O/P EST MOD 30 MIN: CPT | Performed by: INTERNAL MEDICINE

## 2019-09-06 NOTE — PROGRESS NOTES
"[facility]    Encounter Date:09/06/2019        Patient ID: Molly Joshua is a 39 y.o. female.    PCP: Bashir Das MD     Chief Complaint: PVC's      PROBLEM LIST:  1. Premature ventricular contractions: .   a. Hospitalized at [facility], 10/17/2013, with 1-2 week history of dizziness and near syncope, noted to have runs of wide QRS complex tachycardia felt to be left ventricular outflow tract VT.   b. Treated with Flecainide.   c. Echocardiogram during her pregnancy in 2001 or 2002 was reportedly unremarkable.   d. Echocardiogram at [facility], 10/18/2013: EF 55-60%. No significant valvular abnormalities.   e. Echocardiogram, 02/22/2016: EF > 65%. Trace MR/TR.  f. 24h Holter 01/29/2016: Sinus rhythm with very frequent PVCs (12% of QRS complexes).  g. Treadmill stress test, 02/22/2016: Average exercise capacity, normal hemodynamic response. Negative test for angina or ischemic ST segment changes. No exercise-induced arrhythmias although frequent PVCs were noted at rest which improved with exercise.  h. EP study, 06/02/2016: Identification of 2 PVCs (posterior Left ventricular wall blow mitral valve annulus- associated with NSVT, successfully ablated) second PVC felt to be deep seated and would potentially require epicardial ablation.   i. 24h Holter, 09/21/2016: Sinus rhythm with ventricular ectopy (5%), occasional bigeminy, rare trigeminy. Isolated PACs.  2. Anxiety and panic disorder.   3. Chronic back pain.   4. \"Hypertensive heart disease\" as noted in her records. No documentation available.   5. Family history of coronary artery disease, father has had stents.   6. Dyslipidemia.   7. DM2     History of Present Illness  Patient presents today for follow-up with a history of PVCs and cardiac risk factors. Since last visit, she has been experiencing occasional \"jerks\" while sleeping. She does not experience this during the day.  This is no mostly noticed by her , patient " states that this does not awaken her from sleep.  Her  worries that she is under a lot of stress and experiences anxiety. Denies chest pain, shortness of breath, leg swelling, palpitations, and syncope. Remains busy and active with no significant limitations.  States that some days she has a lot of energy while on other days she gets fatigued.    No Known Allergies      Current Outpatient Medications:   •  aspirin 81 MG EC tablet, Take 81 mg by mouth daily., Disp: , Rfl:   •  Cholecalciferol (VITAMIN D3) 38702 units tablet, Take  by mouth 1 (One) Time Per Week., Disp: , Rfl:   •  escitalopram (LEXAPRO) 10 MG tablet, Take 1 tablet by mouth Daily., Disp: 30 tablet, Rfl: 6  •  ferrous sulfate 325 (65 FE) MG tablet, Take 325 mg by mouth Daily With Breakfast., Disp: , Rfl:   •  flecainide (TAMBOCOR) 100 MG tablet, Take 1 tablet by mouth 2 (Two) Times a Day., Disp: 60 tablet, Rfl: 11  •  lisinopril (PRINIVIL,ZESTRIL) 5 MG tablet, Take 1 tablet by mouth Daily., Disp: 30 tablet, Rfl: 11  •  metFORMIN (GLUCOPHAGE) 500 MG tablet, Take 500 mg by mouth 2 (Two) Times a Day With Meals., Disp: , Rfl:   •  omeprazole (priLOSEC) 40 MG capsule, Take 40 mg by mouth Daily., Disp: , Rfl:   •  rosuvastatin (CRESTOR) 20 MG tablet, Take 20 mg by mouth Daily., Disp: , Rfl:     The following portions of the patient's history were reviewed and updated as appropriate: allergies, current medications, past family history, past medical history, past social history, past surgical history and problem list.    ROS  Review of Systems   Constitution: Negative for chills, fatigue, fever, generalized weakness. Positive for weight loss.  Cardiovascular: Negative for palpitations, chest pain, claudication, dyspnea on exertion, leg swelling, orthopnea, paroxysmal nocturnal dyspnea and syncope.  Respiratory: Negative for cough, shortness of breath, and wheezing.  HENT: Negative for ear pain, nosebleeds, and tinnitus.  Gastrointestinal: Negative for  "abdominal pain, constipation, diarrhea, nausea and vomiting.   Genitourinary: No urinary symptoms   Musculoskeletal: Negative for muscle cramps.  Neurological: Negative for dizziness, headaches, loss of balance, numbness, and symptoms of stroke.  Psychiatric: Positive for anxiety.    All other systems reviewed and are negative.    Objective:     /74 (BP Location: Left arm, Patient Position: Sitting)   Pulse 68   Ht 157.5 cm (62\")   Wt 64.3 kg (141 lb 12.8 oz)   BMI 25.94 kg/m²        Physical Exam  Constitutional: Patient appears well-developed and well-nourished.   HENT: HEENT exam unremarkable.   Neck: Neck supple. No JVD present. No carotid bruits.   Cardiovascular: Normal rate, regular rhythm and normal heart sounds. No murmur heard.   2+ symmetric pulses.   Pulmonary/Chest: Breath sounds normal. Does not exhibit tenderness.   Abdominal: Abdomen benign.   Musculoskeletal: Does not exhibit edema.   Neurological: Neurological exam unremarkable.   Vitals reviewed.    Lab Review:     Procedures       Assessment:      Diagnosis Plan   1. PVC (premature ventricular contraction)  Schedule echocardiogram to assess heart and valve anatomy and function. Continue flecainide 100 mg BID.    2. Essential hypertension  Well-controlled, continue current medications.   3. Dyslipidemia  Monitored by PCP, continue rosuvastatin 20 mg.     Plan:   Schedule echocardiogram to reassess heart and valve anatomy and function with history of PVCs and recent nonspecific fatigue symptoms..  Holter monitor to assess for palpitations due to nonspecific complaints of jerking during the night.  Although it appears that she is experiencing some reflux activity..  Decrease sugar and soda intake for management of her fatty liver disease..  Continue current medications.   FU in 12 MO, sooner as needed.  Thank you for allowing us to participate in the care of your patient.     Scribed for Joseph Flower MD by Darby Valentin. 9/6/2019  3:43 " PM      I, Joseph Flower MD, personally performed the services described in this documentation as scribed by the above named individual in my presence, and it is both accurate and complete.  9/6/2019  4:05 PM        Please note that portions of this note may have been completed with a voice recognition program. Efforts were made to edit the dictations, but occasionally words are mistranscribed.

## 2019-10-11 ENCOUNTER — HOSPITAL ENCOUNTER (OUTPATIENT)
Dept: CARDIOLOGY | Facility: HOSPITAL | Age: 39
Discharge: HOME | End: 2019-10-11
Admitting: INTERNAL MEDICINE

## 2019-10-11 DIAGNOSIS — I49.3 PVC (PREMATURE VENTRICULAR CONTRACTION): ICD-10-CM

## 2019-10-11 LAB
BH CV ECHO MEAS - AO MAX PG (FULL): 10.9 MMHG
BH CV ECHO MEAS - AO MAX PG: 20.1 MMHG
BH CV ECHO MEAS - AO MEAN PG (FULL): 7 MMHG
BH CV ECHO MEAS - AO MEAN PG: 11 MMHG
BH CV ECHO MEAS - AO ROOT AREA (BSA CORRECTED): 1.4
BH CV ECHO MEAS - AO ROOT AREA: 4.1 CM^2
BH CV ECHO MEAS - AO ROOT DIAM: 2.3 CM
BH CV ECHO MEAS - AO V2 MAX: 224 CM/SEC
BH CV ECHO MEAS - AO V2 MEAN: 158 CM/SEC
BH CV ECHO MEAS - AO V2 VTI: 48.5 CM
BH CV ECHO MEAS - AVA(I,A): 1.9 CM^2
BH CV ECHO MEAS - AVA(I,D): 1.9 CM^2
BH CV ECHO MEAS - AVA(V,A): 2.2 CM^2
BH CV ECHO MEAS - AVA(V,D): 2.2 CM^2
BH CV ECHO MEAS - BSA(HAYCOCK): 1.7 M^2
BH CV ECHO MEAS - BSA: 1.6 M^2
BH CV ECHO MEAS - BZI_BMI: 25.8 KILOGRAMS/M^2
BH CV ECHO MEAS - BZI_METRIC_HEIGHT: 157.5 CM
BH CV ECHO MEAS - BZI_METRIC_WEIGHT: 64 KG
BH CV ECHO MEAS - EDV(CUBED): 77.7 ML
BH CV ECHO MEAS - EDV(TEICH): 81.6 ML
BH CV ECHO MEAS - EF(CUBED): 80.9 %
BH CV ECHO MEAS - EF(TEICH): 73.8 %
BH CV ECHO MEAS - ESV(CUBED): 14.9 ML
BH CV ECHO MEAS - ESV(TEICH): 21.4 ML
BH CV ECHO MEAS - FS: 42.4 %
BH CV ECHO MEAS - IVS/LVPW: 0.97
BH CV ECHO MEAS - IVSD: 0.63 CM
BH CV ECHO MEAS - LA DIMENSION: 3.4 CM
BH CV ECHO MEAS - LA/AO: 1.5
BH CV ECHO MEAS - LAD MAJOR: 5.2 CM
BH CV ECHO MEAS - LAT PEAK E' VEL: 12.7 CM/SEC
BH CV ECHO MEAS - LATERAL E/E' RATIO: 9.2
BH CV ECHO MEAS - LV MASS(C)D: 77.9 GRAMS
BH CV ECHO MEAS - LV MASS(C)DI: 47.3 GRAMS/M^2
BH CV ECHO MEAS - LV MAX PG: 9.2 MMHG
BH CV ECHO MEAS - LV MEAN PG: 4 MMHG
BH CV ECHO MEAS - LV V1 MAX: 150.9 CM/SEC
BH CV ECHO MEAS - LV V1 MEAN: 92.9 CM/SEC
BH CV ECHO MEAS - LV V1 VTI: 28.9 CM
BH CV ECHO MEAS - LVIDD: 4.3 CM
BH CV ECHO MEAS - LVIDS: 2.5 CM
BH CV ECHO MEAS - LVOT AREA (M): 3.1 CM^2
BH CV ECHO MEAS - LVOT AREA: 3.2 CM^2
BH CV ECHO MEAS - LVOT DIAM: 2 CM
BH CV ECHO MEAS - LVPWD: 0.65 CM
BH CV ECHO MEAS - MED PEAK E' VEL: 9.5 CM/SEC
BH CV ECHO MEAS - MEDIAL E/E' RATIO: 12.2
BH CV ECHO MEAS - MV A MAX VEL: 92.5 CM/SEC
BH CV ECHO MEAS - MV DEC SLOPE: 492.7 CM/SEC^2
BH CV ECHO MEAS - MV DEC TIME: 0.25 SEC
BH CV ECHO MEAS - MV E MAX VEL: 118.4 CM/SEC
BH CV ECHO MEAS - MV E/A: 1.3
BH CV ECHO MEAS - MV P1/2T MAX VEL: 150.7 CM/SEC
BH CV ECHO MEAS - MV P1/2T: 89.6 MSEC
BH CV ECHO MEAS - MVA P1/2T LCG: 1.5 CM^2
BH CV ECHO MEAS - MVA(P1/2T): 2.5 CM^2
BH CV ECHO MEAS - PA ACC SLOPE: 466.8 CM/SEC^2
BH CV ECHO MEAS - PA ACC TIME: 0.15 SEC
BH CV ECHO MEAS - PA PR(ACCEL): 12.5 MMHG
BH CV ECHO MEAS - RAP SYSTOLE: 3 MMHG
BH CV ECHO MEAS - RV MAX PG: 1.4 MMHG
BH CV ECHO MEAS - RV V1 MAX: 58.6 CM/SEC
BH CV ECHO MEAS - RVSP: 23 MMHG
BH CV ECHO MEAS - SI(AO): 121.4 ML/M^2
BH CV ECHO MEAS - SI(CUBED): 38.2 ML/M^2
BH CV ECHO MEAS - SI(LVOT): 56 ML/M^2
BH CV ECHO MEAS - SI(TEICH): 36.5 ML/M^2
BH CV ECHO MEAS - SV(AO): 200.1 ML
BH CV ECHO MEAS - SV(CUBED): 62.9 ML
BH CV ECHO MEAS - SV(LVOT): 92.2 ML
BH CV ECHO MEAS - SV(TEICH): 60.2 ML
BH CV ECHO MEAS - TAPSE (>1.6): 2.4 CM2
BH CV ECHO MEAS - TR MAX PG: 20 MMHG
BH CV ECHO MEAS - TR MAX VEL: 224 CM/SEC
BH CV ECHO MEASUREMENTS AVERAGE E/E' RATIO: 10.67
BH CV XLRA - RV BASE: 4.2 CM
BH CV XLRA - RV LENGTH: 6.2 CM
BH CV XLRA - RV MID: 4 CM
BH CV XLRA - TDI S': 14 CM/SEC
LEFT ATRIUM VOLUME INDEX: 20.6 ML/M^2
LEFT ATRIUM VOLUME: 34 ML
LV EF 2D ECHO EST: 60 %
MAXIMAL PREDICTED HEART RATE: 180 BPM
STRESS TARGET HR: 153 BPM

## 2019-10-11 PROCEDURE — 93306 TTE W/DOPPLER COMPLETE: CPT

## 2019-10-11 PROCEDURE — 93306 TTE W/DOPPLER COMPLETE: CPT | Performed by: INTERNAL MEDICINE

## 2019-10-23 ENCOUNTER — TRANSCRIBE ORDERS (OUTPATIENT)
Dept: ADMINISTRATIVE | Facility: HOSPITAL | Age: 39
End: 2019-10-23

## 2019-10-23 DIAGNOSIS — H90.5 CENTRAL HEARING LOSS: Primary | ICD-10-CM

## 2019-10-28 ENCOUNTER — HOSPITAL ENCOUNTER (OUTPATIENT)
Dept: MRI IMAGING | Facility: HOSPITAL | Age: 39
Discharge: HOME | End: 2019-10-28
Admitting: OTOLARYNGOLOGY

## 2019-10-28 DIAGNOSIS — H90.5 CENTRAL HEARING LOSS: ICD-10-CM

## 2019-10-28 PROCEDURE — 0 GADOBENATE DIMEGLUMINE 529 MG/ML SOLUTION: Performed by: OTOLARYNGOLOGY

## 2019-10-28 PROCEDURE — A9577 INJ MULTIHANCE: HCPCS | Performed by: OTOLARYNGOLOGY

## 2019-10-28 PROCEDURE — 70553 MRI BRAIN STEM W/O & W/DYE: CPT

## 2019-10-28 RX ADMIN — GADOBENATE DIMEGLUMINE 13 ML: 529 INJECTION, SOLUTION INTRAVENOUS at 10:36

## 2020-05-13 ENCOUNTER — OFFICE VISIT (OUTPATIENT)
Dept: CARDIOLOGY | Facility: CLINIC | Age: 40
End: 2020-05-13

## 2020-05-13 VITALS
TEMPERATURE: 97.6 F | HEART RATE: 72 BPM | WEIGHT: 137.2 LBS | SYSTOLIC BLOOD PRESSURE: 118 MMHG | BODY MASS INDEX: 25.25 KG/M2 | DIASTOLIC BLOOD PRESSURE: 78 MMHG | HEIGHT: 62 IN | OXYGEN SATURATION: 99 %

## 2020-05-13 DIAGNOSIS — I10 ESSENTIAL HYPERTENSION: ICD-10-CM

## 2020-05-13 DIAGNOSIS — I49.3 PVC (PREMATURE VENTRICULAR CONTRACTION): Primary | ICD-10-CM

## 2020-05-13 PROCEDURE — 93000 ELECTROCARDIOGRAM COMPLETE: CPT | Performed by: NURSE PRACTITIONER

## 2020-05-13 PROCEDURE — 99213 OFFICE O/P EST LOW 20 MIN: CPT | Performed by: NURSE PRACTITIONER

## 2020-05-13 NOTE — PROGRESS NOTES
"Molly Mallory Josiane  1980    [phone] (work)      05/13/2020    [facility] [place] [facility]IOLOGY     Bashir Das MD  [address] [id number]    Chief Complaint   Patient presents with   • Cardiac arrhythmia       Problem List:   1. Premature ventricular contractions: .   a. Hospitalized at [facility], 10/17/2013, with 1-2 week history of dizziness and near syncope, noted to have runs of wide QRS complex tachycardia felt to be left ventricular outflow tract VT.   b. Treated with Flecainide.   c. Echocardiogram during her pregnancy in 2001 or 2002 was reportedly unremarkable.   d. Echocardiogram at [facility], 10/18/2013, showed normal left ventricular size and function. Ejection fraction 55% to 60%. No significant valvular abnormalities.   e. Echocardiogram, 02/22/2016, showed normal LV systolic function with ejection fraction greater than 65%. Trace mitral and trace tricuspid regurgitation.  f. A 24-hour Holter 01/29/2016 showed sinus rhythm with very frequent PVCs (12% of QRS complexes).  g. Treadmill stress test, 02/22/2016: Remarkable for average exercise capacity, normal hemodynamic response. Negative test for angina or ischemic ST segment changes. No exercise-induced arrhythmias although frequent PVCs were noted at rest which improved with exercise.  h. Ep study With identification of 2 PVCs (posterior Left ventricular wall blow mitral valve annulus- associated with NSVT, successfully ablated) second PVC felt to be deep seated and would potentially require epicardial ablation. 6/2/16  i. 24 hour Holter monitor 9/6/2019: Average HR 68 bpm ( bpm), rare PVC's  j. Echocardiogram 10/11/19: EF 60%, mild MR/TR  2. Anxiety and panic disorder.   3. Chronic back pain.   4. \"Hypertensive heart disease\" as noted in her records. No documentation available.   5. Family history of coronary artery disease, father has had stents. " "  6. Dyslipidemia.   7.   DM2     Allergies  No Known Allergies    Current Medications    Current Outpatient Medications:   •  aspirin 81 MG EC tablet, Take 81 mg by mouth daily., Disp: , Rfl:   •  escitalopram (LEXAPRO) 10 MG tablet, Take 1 tablet by mouth Daily., Disp: 30 tablet, Rfl: 6  •  flecainide (TAMBOCOR) 100 MG tablet, Take 1 tablet by mouth 2 (Two) Times a Day., Disp: 60 tablet, Rfl: 11  •  lisinopril (PRINIVIL,ZESTRIL) 5 MG tablet, Take 1 tablet by mouth Daily., Disp: 30 tablet, Rfl: 11  •  metFORMIN (GLUCOPHAGE) 500 MG tablet, Take 500 mg by mouth 2 (Two) Times a Day With Meals., Disp: , Rfl:   •  rosuvastatin (CRESTOR) 20 MG tablet, Take 40 mg by mouth Daily., Disp: , Rfl:     History of Present Illness   HPI    Pt presents for follow up of PVC's HTN. Since we last saw the pt, pt denies any palpitations, SOB, CP, LH, or dizziness. Denies any hospitalizations, ER visits, bleeding, or TIA/CVA symptoms. Overall feels well.  Had echo and holter monitor since last visit which were both unremarkable.    ROS:  General:  Denies fatigue, weight gain or loss  Cardiovascular:  Denies CP, PND, syncope, near syncope, edema or palpitations.  Pulmonary:  Denies HOLLAND, cough, or wheezing      Vitals:    05/13/20 1501   BP: 118/78   BP Location: Left arm   Patient Position: Sitting   Pulse: 72   Temp: 97.6 °F (36.4 °C)   SpO2: 99%   Weight: 62.2 kg (137 lb 3.2 oz)   Height: 157.5 cm (62\")     PE:  General: NAD  Neck: no JVD, no carotid bruits, no TM  Heart RRR, NL S1, S2, no rubs, 2/6 systolic ejection murmur  Lungs: CTA, no wheezes, rhonchi, or rales  Abd: soft, non-tender, NL BS  Ext: No musculoskeletal deformities, no edema, cyanosis, or clubbing  Psych: normal mood and affect    Diagnostic Data:        ECG 12 Lead  Date/Time: 5/13/2020 3:23 PM  Performed by: Jackie Zimmer APRN  Authorized by: Jackie Zimmer APRN   Comparison: compared with previous ECG from 7/24/2019  Similar to previous " ECG  Rhythm: sinus rhythm  BPM: 67              1. PVC (premature ventricular contraction)    2. Essential hypertension          Plan:  1) PVC's:  - Well controlled on Flecainide.  QRS normal.    - Continue present medications.   2) HTN:  - Well controlled on Lisinopril  - Wt loss, exercise, salt reduction    F/up in 8 months    [facility]  5/13/2020  15:29

## 2020-10-16 ENCOUNTER — OFFICE VISIT (OUTPATIENT)
Dept: CARDIOLOGY | Facility: CLINIC | Age: 40
End: 2020-10-16

## 2020-10-16 VITALS
OXYGEN SATURATION: 99 % | TEMPERATURE: 97.5 F | HEART RATE: 70 BPM | BODY MASS INDEX: 25.76 KG/M2 | DIASTOLIC BLOOD PRESSURE: 84 MMHG | SYSTOLIC BLOOD PRESSURE: 136 MMHG | HEIGHT: 62 IN | WEIGHT: 140 LBS

## 2020-10-16 DIAGNOSIS — E78.5 DYSLIPIDEMIA: ICD-10-CM

## 2020-10-16 DIAGNOSIS — I49.3 PVC (PREMATURE VENTRICULAR CONTRACTION): Primary | ICD-10-CM

## 2020-10-16 DIAGNOSIS — I10 ESSENTIAL HYPERTENSION: ICD-10-CM

## 2020-10-16 PROCEDURE — 99214 OFFICE O/P EST MOD 30 MIN: CPT | Performed by: INTERNAL MEDICINE

## 2020-10-16 PROCEDURE — 93000 ELECTROCARDIOGRAM COMPLETE: CPT | Performed by: INTERNAL MEDICINE

## 2020-10-16 RX ORDER — FLECAINIDE ACETATE 100 MG/1
100 TABLET ORAL 2 TIMES DAILY
Qty: 180 TABLET | Refills: 3 | Status: SHIPPED | OUTPATIENT
Start: 2020-10-16

## 2020-10-16 RX ORDER — ASPIRIN 81 MG/1
81 TABLET ORAL DAILY
Qty: 100 TABLET | Refills: 3 | Status: SHIPPED | OUTPATIENT
Start: 2020-10-16 | End: 2021-06-08

## 2020-10-16 NOTE — PROGRESS NOTES
"[facility] Cardiology    Encounter Date: 10/16/2020    Patient ID: Molly Joshua is a 40 y.o. female.  : 1980     PCP: Bashir Das MD       Chief Complaint: PVC and Hypertensive Heart Disease with diastolic congestive heart f      PROBLEM LIST:  1. Premature ventricular contractions: .   a. Hospitalized at [facility], 10/17/2013, with 1-2 week history of dizziness and near syncope, noted to have runs of wide QRS complex tachycardia felt to be left ventricular outflow tract VT.   b. Treated with Flecainide.   c. Echocardiogram during her pregnancy in  or  was reportedly unremarkable.   d. Echocardiogram at [facility], 10/18/2013: EF 55-60%. No significant valvular abnormalities.   e. Echocardiogram, 2016: EF > 65%. Trace MR/TR.  f. 24h Holter 2016: Sinus rhythm with very frequent PVCs (12% of QRS complexes).  g. Treadmill stress test, 2016: Average exercise capacity, normal hemodynamic response. Negative test for angina or ischemic ST segment changes. No exercise-induced arrhythmias although frequent PVCs were noted at rest which improved with exercise.  h. EP study, 2016: Identification of 2 PVCs (posterior Left ventricular wall blow mitral valve annulus- associated with NSVT, successfully ablated) second PVC felt to be deep seated and would potentially require epicardial ablation.   i. 24h Holter, 2016: Sinus rhythm with ventricular ectopy (5%), occasional bigeminy, rare trigeminy. Isolated PACs.  j. 48h Holter monitor, 2020: Rare PVC's. No significant arrhythmias.   k. Echocardiogram, 10/11/2019: EF 60%. Mild MR. Trace TR.   2. Anxiety and panic disorder.   3. Chronic back pain.   4. \"Hypertensive heart disease\" as noted in her records. No documentation available.   5. Family history of coronary artery disease, father has had stents.   6. Dyslipidemia.   7. DM2     History of Present Illness  Patient presents today for an annual " follow-up with a history of PVC's and cardiac risk factors. Since last visit, has been feeling well from a cardiovascular standpoint. She has not been following an exercise routine however remains busy at work and with household chores.. She reports an episode of weakness that was relieved by consuming sugar and feels that it was due to low blood sugar, she also notes that she occasionally experiences shortness of breath when climbing stairs. Patient otherwise denies chest pain, PND, edema, palpitations, syncope, or presyncope at this time.       No Known Allergies      Current Outpatient Medications:   •  aspirin 81 MG EC tablet, Take 81 mg by mouth daily., Disp: , Rfl:   •  escitalopram (LEXAPRO) 10 MG tablet, Take 1 tablet by mouth Daily. (Patient taking differently: Take 20 mg by mouth Daily.), Disp: 30 tablet, Rfl: 6  •  flecainide (TAMBOCOR) 100 MG tablet, Take 1 tablet by mouth 2 (Two) Times a Day., Disp: 60 tablet, Rfl: 11  •  lisinopril (PRINIVIL,ZESTRIL) 5 MG tablet, Take 1 tablet by mouth Daily., Disp: 30 tablet, Rfl: 11  •  metFORMIN (GLUCOPHAGE) 500 MG tablet, Take 500 mg by mouth 2 (Two) Times a Day With Meals., Disp: , Rfl:   •  rosuvastatin (CRESTOR) 20 MG tablet, Take 40 mg by mouth Daily., Disp: , Rfl:     The following portions of the patient's history were reviewed and updated as appropriate: allergies, current medications, past family history, past medical history, past social history, past surgical history and problem list.    ROS  Review of Systems   Constitution: Negative for chills, fever, fatigue, generalized weakness.   Cardiovascular: Negative for chest pain, leg swelling, palpitations, orthopnea, and syncope. Positive for dyspnea on exertion.  Respiratory: Negative for cough, shortness of breath, and wheezing.  HENT: Negative for ear pain, nosebleeds, and tinnitus.  Gastrointestinal: Negative for abdominal pain, constipation, diarrhea, nausea and vomiting.   Genitourinary: No urinary  "symptoms.  Musculoskeletal: Negative for muscle cramps.  Neurological: Negative for dizziness, headaches, loss of balance, numbness, and symptoms of stroke.  Psychiatric: Normal mental status.     All other systems reviewed and are negative.        Objective:     /84 (BP Location: Left arm, Patient Position: Sitting)   Pulse 70   Temp 97.5 °F (36.4 °C)   Ht 157.5 cm (62\")   Wt 63.5 kg (140 lb)   SpO2 99%   BMI 25.61 kg/m²      Physical Exam  Constitutional: Patient appears well-developed and well-nourished.   HENT: HEENT exam unremarkable.   Neck: Neck supple. No JVD present. No carotid bruits.   Cardiovascular: Normal rate, regular rhythm and normal heart sounds. Faint systolic murmur  2+ symmetric pulses.   Pulmonary/Chest: Breath sounds normal. Does not exhibit tenderness.   Abdominal: Abdomen benign.   Musculoskeletal: Does not exhibit edema.   Neurological: Neurological exam unremarkable.   Vitals reviewed.    Data Review:          ECG 12 Lead    Date/Time: 10/16/2020 9:49 AM  Performed by: Joseph Flower MD  Authorized by: Joseph Flower MD   Comparison: compared with previous ECG from 5/13/2020  Similar to previous ECG  Rhythm: sinus rhythm and sinus arrhythmia    Clinical impression: normal ECG  Comments: Normal QT interval               Assessment:      Diagnosis Plan   1. PVC (premature ventricular contraction), she has a faint murmur with previously unremarkable echocardiogram. Echocardiogram from 10/11/2019 discussed with patient in office today. Continue flecainide 100mg BID.   2. Essential hypertension  Acceptable control; continue lisinopril 5mg.    3. Dyslipidemia  Monitored by PCP; continue rosuvastatin 20mg. Begin routine aerobic exercise for at least 30 minutes 5 days per week.     Plan:   Stable cardiac status.  Faint systolic murmur with previously unremarkable echocardiogram discussed and she was reassured that this may be a flow murmur.  In the absence of symptoms no further " investigation is indicated.  Begin routine aerobic exercise for at least 30 minutes 5 days per week.  Continue current medications.   FU in 12 MO, sooner as needed.  Thank you for allowing us to participate in the care of your patient.     Scribed for Joseph Flower MD by Adelso Harkins. 10/16/2020  14:11 EDT     I, Joseph Flower MD, personally performed the services described in this documentation as scribed by the above named individual in my presence, and it is both accurate and complete.  10/16/2020  15:49 EDT        Please note that portions of this note may have been completed with a voice recognition program. Efforts were made to edit the dictations, but occasionally words are mistranscribed.

## 2021-01-20 ENCOUNTER — OFFICE VISIT (OUTPATIENT)
Dept: CARDIOLOGY | Facility: CLINIC | Age: 41
End: 2021-01-20

## 2021-01-20 VITALS
BODY MASS INDEX: 25.58 KG/M2 | HEART RATE: 72 BPM | SYSTOLIC BLOOD PRESSURE: 128 MMHG | DIASTOLIC BLOOD PRESSURE: 72 MMHG | HEIGHT: 62 IN | WEIGHT: 139 LBS | OXYGEN SATURATION: 100 %

## 2021-01-20 DIAGNOSIS — I49.3 PVC (PREMATURE VENTRICULAR CONTRACTION): Primary | ICD-10-CM

## 2021-01-20 DIAGNOSIS — I10 ESSENTIAL HYPERTENSION: ICD-10-CM

## 2021-01-20 PROCEDURE — 99213 OFFICE O/P EST LOW 20 MIN: CPT | Performed by: INTERNAL MEDICINE

## 2021-01-20 PROCEDURE — 93000 ELECTROCARDIOGRAM COMPLETE: CPT | Performed by: INTERNAL MEDICINE

## 2021-01-20 RX ORDER — CETIRIZINE HYDROCHLORIDE 10 MG/1
10 TABLET ORAL DAILY
COMMUNITY

## 2021-01-20 NOTE — PROGRESS NOTES
"Molly Mallory Josiane  1980  [phone]    01/20/2021    [facility] CARDIOLOGY     Referring Provider: No ref. provider found     Bashir Das MD  [address] [id number]    Chief Complaint   Patient presents with   • premature ventricular contractions        Problem List:     1. Premature ventricular contractions: .   a. Hospitalized at [facility], 10/17/2013, with 1-2 week history of dizziness and near syncope, noted to have runs of wide QRS complex tachycardia felt to be left ventricular outflow tract VT.   b. Treated with Flecainide.   c. Echocardiogram during her pregnancy in 2001 or 2002 was reportedly unremarkable.   d. Echocardiogram at [facility], 10/18/2013, showed normal left ventricular size and function. Ejection fraction 55% to 60%. No significant valvular abnormalities.   e. Echocardiogram, 02/22/2016, showed normal LV systolic function with ejection fraction greater than 65%. Trace mitral and trace tricuspid regurgitation.  f. A 24-hour Holter 01/29/2016 showed sinus rhythm with very frequent PVCs (12% of QRS complexes).  g. Treadmill stress test, 02/22/2016: Remarkable for average exercise capacity, normal hemodynamic response. Negative test for angina or ischemic ST segment changes. No exercise-induced arrhythmias although frequent PVCs were noted at rest which improved with exercise.  h. Ep study With identification of 2 PVCs (posterior Left ventricular wall blow mitral valve annulus- associated with NSVT, successfully ablated) second PVC felt to be deep seated and would potentially require epicardial ablation. 6/2/16  i. 24 hour Holter monitor 9/6/2019: Average HR 68 bpm ( bpm), rare PVC's  j. Echocardiogram 10/11/19: EF 60%, mild MR/TR  2. Anxiety and panic disorder.   3. Chronic back pain.   4. \"Hypertensive heart disease\" as noted in her records. No documentation available.   5. Family history of " "coronary artery disease, father has had stents.   6. Dyslipidemia.   7.   DM2     Allergies  Allergies   Allergen Reactions   • Iodopropynyl Butylcarbamate [Iodopropynyl] Itching       Current Medications    Current Outpatient Medications:   •  aspirin 81 MG EC tablet, Take 1 tablet by mouth Daily., Disp: 100 tablet, Rfl: 3  •  cetirizine (zyrTEC) 10 MG tablet, Take 10 mg by mouth Daily., Disp: , Rfl:   •  escitalopram (LEXAPRO) 10 MG tablet, Take 1 tablet by mouth Daily. (Patient taking differently: Take 20 mg by mouth Daily.), Disp: 30 tablet, Rfl: 6  •  flecainide (TAMBOCOR) 100 MG tablet, Take 1 tablet by mouth 2 (Two) Times a Day., Disp: 180 tablet, Rfl: 3  •  lisinopril (PRINIVIL,ZESTRIL) 5 MG tablet, Take 1 tablet by mouth Daily., Disp: 30 tablet, Rfl: 11  •  metFORMIN (GLUCOPHAGE) 500 MG tablet, Take 500 mg by mouth 2 (Two) Times a Day With Meals., Disp: , Rfl:   •  rosuvastatin (CRESTOR) 20 MG tablet, Take 40 mg by mouth Daily., Disp: , Rfl:     History of Present Illness     Pt presents for follow up of PVCs and HTN. Since we last saw the pt, pt denies any palpitations, SOB, CP, LH, and dizziness. Denies any hospitalizations, ER visits, bleeding issues on ASA, or TIA/CVA symptoms. Continues to be complaint with flecainide.  Overall feels well. BP stabl e at home    ROS:  General:  Denies fatigue, weight gain or loss  Cardiovascular:  Denies CP, PND, syncope, near syncope, edema or palpitations.  Pulmonary:  Denies HOLLAND, cough, or wheezing      Vitals:    01/20/21 1456   BP: 128/72   BP Location: Left arm   Patient Position: Sitting   Pulse: 72   SpO2: 100%   Weight: 63 kg (139 lb)   Height: 157.5 cm (62\")     Body mass index is 25.42 kg/m².  PE:  General: NAD  Neck: no JVD, no carotid bruits, no TM  Heart RRR, NL S1, S2, no rubs, murmurs  Lungs: CTA, no wheezes, rhonchi, or rales  Abd: soft, non-tender, NL BS  Ext: No musculoskeletal deformities, no edema, cyanosis, or clubbing  Psych: normal mood and " affect    Diagnostic Data:        ECG 12 Lead    Date/Time: 1/20/2021 3:08 PM  Performed by: Chance Kelly MD  Authorized by: Chance Kelly MD   Comparison: compared with previous ECG from 10/16/2020  Similar to previous ECG  Rhythm: sinus rhythm  Rate: normal  BPM: 64  QRS axis: normal              1. PVC (premature ventricular contraction)    2. Essential hypertension      Plan:    1) PVC's:  - Well controlled on Flecainide.  QRS normal.    - Continue present medications. Stop ASA    2) HTN:  - Well controlled on Lisinopril  - Wt loss, exercise, salt reduction    F/up in 6/12 months    Scribed for Chance Kelly MD by BROOKLYN Beasley. 1/20/2021 15:10 Chance HAZEL MD, personally performed the services described in this documentation as scribed by the above named individual in my presence, and it is both accurate and complete.  1/20/2021  15:16 EST

## 2021-06-08 ENCOUNTER — OFFICE VISIT (OUTPATIENT)
Dept: ORTHOPEDIC SURGERY | Facility: CLINIC | Age: 41
End: 2021-06-08

## 2021-06-08 VITALS
BODY MASS INDEX: 25.21 KG/M2 | DIASTOLIC BLOOD PRESSURE: 92 MMHG | SYSTOLIC BLOOD PRESSURE: 146 MMHG | HEIGHT: 62 IN | WEIGHT: 137 LBS | HEART RATE: 69 BPM

## 2021-06-08 DIAGNOSIS — M79.641 RIGHT HAND PAIN: ICD-10-CM

## 2021-06-08 DIAGNOSIS — R20.0 NUMBNESS AND TINGLING OF RIGHT UPPER EXTREMITY: ICD-10-CM

## 2021-06-08 DIAGNOSIS — M54.2 CERVICALGIA: ICD-10-CM

## 2021-06-08 DIAGNOSIS — R20.2 NUMBNESS AND TINGLING OF RIGHT UPPER EXTREMITY: ICD-10-CM

## 2021-06-08 DIAGNOSIS — M25.511 RIGHT SHOULDER PAIN, UNSPECIFIED CHRONICITY: Primary | ICD-10-CM

## 2021-06-08 PROCEDURE — 99204 OFFICE O/P NEW MOD 45 MIN: CPT | Performed by: ORTHOPAEDIC SURGERY

## 2021-06-08 NOTE — PROGRESS NOTES
[facility] Note        Subjective     Pain of the Right Shoulder and Pain of the Right Hand      HPI    Molly Joshua is a 41 y.o. female who presents with new problem of: right shoulder pain.  Onset: atraumatic and gradual in nature. The issue has been ongoing for 1 month(s). Pain is a 7/10 on the pain scale. Pain is described as tingling. Associated symptoms include numbness/tingling in fingers. The pain is worse with working, leisure and certain movements; nothing improve the pain. Previous treatments have included: physical therapy and chiropractor.    I have reviewed the following portions of the patient's history:History of Present Illness and review of systems.      Patient is here today for evaluation of right upper extremity numbness and tingling.  She is referred by her chiropractor Dr. Man.  She has had this difficulty with her right upper extremity and neck for the last 5 years.  She has had multiple injections over the years.  She has been getting treatment from her chiropractor which is helping her.  Patient's main complaints are of neck pain and numbness and tingling throughout her right upper extremity.  Patient tells me she is prediabetic but her last hemoglobin A1c was 7.7.    Past Medical History:   Diagnosis Date   • Anxiety disorder     and panic   • Cardiac arrhythmia    • Chronic back pain    • Dyslipidemia    • Gluten intolerance    • Hypertensive heart disease    • PVC (premature ventricular contraction)     Very frequent PVCs, often symptomatic with additional component of significant anxiety.       History reviewed. No pertinent surgical history.   Family History   Problem Relation Age of Onset   • Heart disease Father         CAD -stents   • Heart disease Other         CAD   • No Known Problems Mother      Social History     Socioeconomic History   • Marital status:      Spouse name: Not on file   • Number of children: Not on file   • Years of  education: Not on file   • Highest education level: Not on file   Tobacco Use   • Smoking status: Never Smoker   • Smokeless tobacco: Never Used   Substance and Sexual Activity   • Alcohol use: No   • Drug use: No   • Sexual activity: Defer      Current Outpatient Medications on File Prior to Visit   Medication Sig Dispense Refill   • cetirizine (zyrTEC) 10 MG tablet Take 10 mg by mouth Daily.     • escitalopram (LEXAPRO) 10 MG tablet Take 1 tablet by mouth Daily. (Patient taking differently: Take 20 mg by mouth Daily.) 30 tablet 6   • flecainide (TAMBOCOR) 100 MG tablet Take 1 tablet by mouth 2 (Two) Times a Day. 180 tablet 3   • lisinopril (PRINIVIL,ZESTRIL) 5 MG tablet Take 1 tablet by mouth Daily. 30 tablet 11   • metFORMIN (GLUCOPHAGE) 500 MG tablet Take 500 mg by mouth 2 (Two) Times a Day With Meals.     • rosuvastatin (CRESTOR) 20 MG tablet Take 40 mg by mouth Daily.     • [DISCONTINUED] aspirin 81 MG EC tablet Take 1 tablet by mouth Daily. 100 tablet 3     No current facility-administered medications on file prior to visit.      Allergies   Allergen Reactions   • Iodopropynyl Butylcarbamate [Iodopropynyl] Itching          Review of Systems   Constitutional: Negative.    HENT: Negative.    Eyes: Negative.    Respiratory: Negative.    Cardiovascular: Negative.    Gastrointestinal: Negative.    Endocrine: Negative.    Genitourinary: Negative.    Musculoskeletal: Positive for arthralgias, back pain, neck pain and neck stiffness.   Skin: Negative.    Allergic/Immunologic: Positive for environmental allergies and food allergies.   Neurological: Positive for numbness (tingling right hand).   Hematological: Negative.    Psychiatric/Behavioral: Negative.         I reviewed the patient's chief complaint, history of present illness, review of systems, past medical history, surgical history, family history, social history, medications and allergy list.        Objective      Physical Exam  /92   Pulse 69   Ht  "157.5 cm (62.01\")   Wt 62.1 kg (137 lb)   BMI 25.05 kg/m²     Body mass index is 25.05 kg/m².    General  Mental Status - alert  General Appearance - cooperative, well groomed, not in acute distress  Orientation - Oriented X3  Build & Nutrition - well developed and well nourished  Posture - normal posture  Gait - normal gait       Ortho Exam  Musculoskeletal   Upper Extremity   Right Shoulder     Inspection and Palpation:     Medial border scapular tenderness-positive    AC Joint Tenderness -none    Sensation is normal    Examination reveals no ecchymosis.        Strength and Tone:    Supraspinatus - 5/5    External Rotators-5/5    Infraspinatus - 5/5    Subscapularis - 5/5    Deltoid - 5/5     Range of Motion      RightShoulder:    Internal Rotation: ROM - L4    External Rotation: AROM - 60 degrees    Elevation through flexion: AROM - 140 degrees        Impingement   Right shoulder    Hunt-Drew impingement test negative    Neer impingement test negative     Functional Testing   Right shoulder    AC crossover adduction test negative    Speeds test negative    Uppercut test negative    O'Briens test negative    Drop arm sign negative    Apprehension relocation negative      Imaging/Studies  Imaging Results (Last 24 Hours)     Procedure Component Value Units Date/Time    XR Hand 3+ View Right [[id number]] Resulted: 06/08/21 1445     Updated: 06/08/21 1446    Narrative:      Right Hand X-Ray    Indication: Pain    Views:  AP, Lateral, and Oblique     Comparison: None    Findings:  No fracture  No bony lesion  Normal soft tissues  Normal joint spaces    Impression:   Negative right hand x-ray for acute bony abnormality      XR Shoulder 2+ View Right [[id number]] Resulted: 06/08/21 1444     Updated: 06/08/21 1445    Narrative:      Right Shoulder X-Ray    Indication: Pain    Study:  AP, axillary lateral, and scapular Y views    Comparison: Right shoulder 5/23/2016    Findings:  No acute fractures are visualized  No " bony lesions are visualized.  Normal soft tissue appearance  AC joint: Mild joint space narrowing  Glenohumeral joint: Minimal joint space narrowing  Acromion type: 2      Impression:    No acute bony abnormalities noted  Type II acromion  Mild AC joint space narrowing              Assessment    Assessment:  1. Right shoulder pain, unspecified chronicity    2. Right hand pain    3. Cervicalgia    4. Numbness and tingling of right upper extremity        Plan:  1. Continue over-the-counter medication as needed for discomfort  2. Cervicalgia with numbness and tingling of the right upper extremity--patient has failed extensive treatment including therapy and anti-inflammatories.  The plan will be for a cervical MRI as well as nerve studies of the upper extremity.  I will see her back to review those 2 studies and go from there.        Patricio Boudreaux MD  06/08/21  15:09 REINALDOT    Dragon disclaimer:  Much of this encounter note is an electronic transcription/translation of spoken language to printed text. The electronic translation of spoken language may permit erroneous, or at times, nonsensical words or phrases to be inadvertently transcribed; Although I have reviewed the note for such errors, some may still exist.

## 2021-06-11 ENCOUNTER — HOSPITAL ENCOUNTER (OUTPATIENT)
Dept: NEUROLOGY | Facility: HOSPITAL | Age: 41
Discharge: HOME | End: 2021-06-11
Admitting: ORTHOPAEDIC SURGERY

## 2021-06-11 DIAGNOSIS — R20.2 NUMBNESS AND TINGLING OF RIGHT UPPER EXTREMITY: ICD-10-CM

## 2021-06-11 DIAGNOSIS — R20.0 NUMBNESS AND TINGLING OF RIGHT UPPER EXTREMITY: ICD-10-CM

## 2021-06-11 PROCEDURE — 95886 MUSC TEST DONE W/N TEST COMP: CPT

## 2021-06-11 PROCEDURE — 95910 NRV CNDJ TEST 7-8 STUDIES: CPT

## 2021-06-22 ENCOUNTER — APPOINTMENT (OUTPATIENT)
Dept: NEUROLOGY | Facility: HOSPITAL | Age: 41
End: 2021-06-22

## 2021-06-28 ENCOUNTER — APPOINTMENT (OUTPATIENT)
Dept: MRI IMAGING | Facility: HOSPITAL | Age: 41
End: 2021-06-28

## 2021-07-01 ENCOUNTER — TELEPHONE (OUTPATIENT)
Dept: ORTHOPEDIC SURGERY | Facility: CLINIC | Age: 41
End: 2021-07-01

## 2021-07-26 ENCOUNTER — HOSPITAL ENCOUNTER (OUTPATIENT)
Dept: MRI IMAGING | Facility: HOSPITAL | Age: 41
Discharge: HOME | End: 2021-07-26
Admitting: ORTHOPAEDIC SURGERY

## 2021-07-26 DIAGNOSIS — M54.2 CERVICALGIA: ICD-10-CM

## 2021-07-26 PROCEDURE — 72141 MRI NECK SPINE W/O DYE: CPT

## 2021-08-02 ENCOUNTER — OFFICE VISIT (OUTPATIENT)
Dept: CARDIOLOGY | Facility: CLINIC | Age: 41
End: 2021-08-02

## 2021-08-02 VITALS
HEIGHT: 62 IN | OXYGEN SATURATION: 97 % | DIASTOLIC BLOOD PRESSURE: 64 MMHG | WEIGHT: 142 LBS | SYSTOLIC BLOOD PRESSURE: 124 MMHG | HEART RATE: 69 BPM | BODY MASS INDEX: 26.13 KG/M2

## 2021-08-02 DIAGNOSIS — I49.3 VENTRICULAR PREMATURE DEPOLARIZATION: ICD-10-CM

## 2021-08-02 DIAGNOSIS — I49.3 PVC (PREMATURE VENTRICULAR CONTRACTION): Primary | ICD-10-CM

## 2021-08-02 PROCEDURE — 93000 ELECTROCARDIOGRAM COMPLETE: CPT | Performed by: PHYSICIAN ASSISTANT

## 2021-08-02 PROCEDURE — 99214 OFFICE O/P EST MOD 30 MIN: CPT | Performed by: PHYSICIAN ASSISTANT

## 2021-08-02 RX ORDER — ASPIRIN 81 MG/1
81 TABLET ORAL DAILY
COMMUNITY
End: 2021-08-26

## 2021-08-02 RX ORDER — EZETIMIBE 10 MG/1
10 TABLET ORAL DAILY
COMMUNITY

## 2021-08-02 NOTE — PROGRESS NOTES
"Molly Mallory Josiane  1980  [phone]    01/20/2021    [facility]     Referring Provider: No ref. provider found     Bashir Das MD  [address] [id number]    Chief Complaint   Patient presents with   • Hypertensive heart disease with diastolic congestive heart f       Problem List:     1. Premature ventricular contractions: .   a. Hospitalized at [facility], 10/17/2013, with 1-2 week history of dizziness and near syncope, noted to have runs of wide QRS complex tachycardia felt to be left ventricular outflow tract VT.   b. Treated with Flecainide.   c. Echocardiogram during her pregnancy in 2001 or 2002 was reportedly unremarkable.   d. Echocardiogram at [facility], 10/18/2013, showed normal left ventricular size and function. Ejection fraction 55% to 60%. No significant valvular abnormalities.   e. Echocardiogram, 02/22/2016, showed normal LV systolic function with ejection fraction greater than 65%. Trace mitral and trace tricuspid regurgitation.  f. A 24-hour Holter 01/29/2016 showed sinus rhythm with very frequent PVCs (12% of QRS complexes).  g. Treadmill stress test, 02/22/2016: Remarkable for average exercise capacity, normal hemodynamic response. Negative test for angina or ischemic ST segment changes. No exercise-induced arrhythmias although frequent PVCs were noted at rest which improved with exercise.  h. Ep study With identification of 2 PVCs (posterior Left ventricular wall blow mitral valve annulus- associated with NSVT, successfully ablated) second PVC felt to be deep seated and would potentially require epicardial ablation. 6/2/16  i. 24 hour Holter monitor 9/6/2019: Average HR 68 bpm ( bpm), rare PVC's  j. Echocardiogram 10/11/19: EF 60%, mild MR/TR  2. Anxiety and panic disorder.   3. Chronic back pain.   4. \"Hypertensive heart disease\" as noted in her records. No documentation available.   5. Family " "history of coronary artery disease, father has had stents.   6. Dyslipidemia.   7.   DM2     Allergies  Allergies   Allergen Reactions   • Iodopropynyl Butylcarbamate [Iodopropynyl] Itching       Current Medications    Current Outpatient Medications:   •  aspirin 81 MG EC tablet, Take 81 mg by mouth Daily., Disp: , Rfl:   •  cetirizine (zyrTEC) 10 MG tablet, Take 10 mg by mouth Daily., Disp: , Rfl:   •  escitalopram (LEXAPRO) 10 MG tablet, Take 1 tablet by mouth Daily. (Patient taking differently: Take 20 mg by mouth Daily.), Disp: 30 tablet, Rfl: 6  •  ezetimibe (ZETIA) 10 MG tablet, Take 10 mg by mouth Daily., Disp: , Rfl:   •  flecainide (TAMBOCOR) 100 MG tablet, Take 1 tablet by mouth 2 (Two) Times a Day., Disp: 180 tablet, Rfl: 3  •  lisinopril (PRINIVIL,ZESTRIL) 5 MG tablet, Take 1 tablet by mouth Daily., Disp: 30 tablet, Rfl: 11  •  metFORMIN (GLUCOPHAGE) 500 MG tablet, Take 500 mg by mouth 2 (Two) Times a Day With Meals., Disp: , Rfl:   •  rosuvastatin (CRESTOR) 20 MG tablet, Take 40 mg by mouth Daily., Disp: , Rfl:     History of Present Illness     Pt presents for follow up of PVCs and HTN. Since we last saw the pt, pt denies any palpitations, SOB, CP, LH, and dizziness. Denies any hospitalizations, ER visits, bleeding issues on ASA, or TIA/CVA symptoms. Continues to be complaint with flecainide.  Overall feels well. BP stabl e at home. She has some good days and bad days with physical exertion but overall feels good.     ROS:  General:  Denies fatigue, weight gain or loss  Cardiovascular:  Denies CP, PND, syncope, near syncope, edema or palpitations.  Pulmonary:  Denies HOLLAND, cough, or wheezing      Vitals:    08/02/21 1322   BP: 124/64   BP Location: Left arm   Patient Position: Sitting   Pulse: 69   SpO2: 97%   Weight: 64.4 kg (142 lb)   Height: 157.5 cm (62\")     Body mass index is 25.97 kg/m².  PE:  General: NAD  Neck: no JVD, no carotid bruits, no TM  Heart RRR, NL S1, S2, no rubs, murmurs  Lungs: " CTA, no wheezes, rhonchi, or rales  Abd: soft, non-tender, NL BS  Ext: No musculoskeletal deformities, no edema, cyanosis, or clubbing  Psych: normal mood and affect    Diagnostic Data:        ECG 12 Lead    Date/Time: 8/2/2021 2:28 PM  Performed by: Amari Batres PA  Authorized by: Amari Batres PA   Rhythm: sinus rhythm  Rate: normal  Conduction: conduction normal  ST Segments: ST segments normal  T Waves: T waves normal  QRS axis: normal  Other: no other findings    Clinical impression: normal ECG            1. PVC (premature ventricular contraction)    2. Ventricular premature depolarization      Plan:    1) PVC's:  - Well controlled on Flecainide.  QRS normal.    - Continue present medications. Stop ASA    2) HTN:  - Well controlled on Lisinopril  - Wt loss, exercise, salt reduction    F/up in 6/12 months

## 2021-08-26 ENCOUNTER — OFFICE VISIT (OUTPATIENT)
Dept: ORTHOPEDIC SURGERY | Facility: CLINIC | Age: 41
End: 2021-08-26

## 2021-08-26 VITALS
SYSTOLIC BLOOD PRESSURE: 165 MMHG | HEART RATE: 70 BPM | DIASTOLIC BLOOD PRESSURE: 81 MMHG | HEIGHT: 62 IN | WEIGHT: 141.98 LBS | BODY MASS INDEX: 26.13 KG/M2

## 2021-08-26 DIAGNOSIS — R20.2 NUMBNESS AND TINGLING OF RIGHT UPPER EXTREMITY: ICD-10-CM

## 2021-08-26 DIAGNOSIS — M54.2 CERVICALGIA: ICD-10-CM

## 2021-08-26 DIAGNOSIS — M25.511 RIGHT SHOULDER PAIN, UNSPECIFIED CHRONICITY: Primary | ICD-10-CM

## 2021-08-26 DIAGNOSIS — M79.641 RIGHT HAND PAIN: ICD-10-CM

## 2021-08-26 DIAGNOSIS — R20.0 NUMBNESS AND TINGLING OF RIGHT UPPER EXTREMITY: ICD-10-CM

## 2021-08-26 PROCEDURE — 99214 OFFICE O/P EST MOD 30 MIN: CPT | Performed by: ORTHOPAEDIC SURGERY

## 2021-08-26 NOTE — PROGRESS NOTES
"    [facility] Note        Subjective     CC: Follow-up (MRI C-Spine follow up. MRI performed 07.26.2021, EMG Follow up. EMG  performed 06.11.2021)      CHAKA Joshua is a 41 y.o. female.  Patient returns today with her daughter and her  for follow-up after the nerve studies of her upper extremity and MRI of her C-spine.  She says the numbness and tingling in her hand have gotten better overall.  Her blood sugars are running higher.  She is having little bit of pain in the left long digit today.    Overall, patient's symptoms are as above.    ROS:    Constiutional:Pt denies fever, chills, nausea, or vomiting.  MSK:as above        Objective      Past Medical History  Past Medical History:   Diagnosis Date   • Anxiety disorder     and panic   • Cardiac arrhythmia    • Chronic back pain    • Dyslipidemia    • Gluten intolerance    • Hypertensive heart disease    • PVC (premature ventricular contraction)     Very frequent PVCs, often symptomatic with additional component of significant anxiety.          Physical Exam  /81   Pulse 70   Ht 157.5 cm (62.01\")   Wt 64.4 kg (141 lb 15.6 oz)   BMI 25.96 kg/m²     Body mass index is 25.96 kg/m².    Patient is well nourished and well developed.        Ortho Exam  Patient has full symmetric range of motion of the shoulders today.    Imaging/Labs/EMG Reviewed:  Imaging Results (Last 24 Hours)     ** No results found for the last 24 hours. **      MRI Cervical Spine Without Contrast  Narrative: EXAMINATION: MRI CERVICAL SPINE WO CONTRAST-      INDICATION: Neck pain, chronic, degenerative changes on xray;  M54.2-Cervicalgia     TECHNIQUE: Multiplanar multisequence MRI of the cervical spine performed  without IV contrast     COMPARISON: NONE     FINDINGS: Vertebral body heights are maintained without evidence of  acute fracture and alignment is anatomic without evidence of listhesis  or subluxation. There is no suspicious marrow " replacing lesion. The  paraspinal soft tissues are unremarkable. The cervical spinal cord is  normal in caliber and signal throughout. Mild multilevel spondylosis  changes are present with areas of involvement noted including     C2-3, minimal facet arthropathy without spinal canal or neuroforaminal  impingement.     C3-4, small disc osteophyte complex and bilateral facet arthropathy with  mild spinal canal and bilateral neuroforaminal narrowing.     C3-4, minimal disc osteophyte complex without significant associated  spinal canal or neuroforaminal impingement.     C4-5, minimal facet arthropathy without spinal canal or neuroforaminal  narrowing.     C5-6, minimal disc osteophyte complex without significant associated  spinal canal or neuroforaminal impingement.     C6-7, no significant spondylosis change.     Impression: Very minimal spondylosis change with some areas of mild disc  osteophyte complex formation and facet arthropathy. There is no  significant resultant spinal canal or neuroforaminal narrowing. The  cervical spinal cord is normal in caliber and signal throughout.        This report was finalized on 7/26/2021 6:02 PM by Amari Stubbs.     We reviewed images and report of the MRI above.  Our interpretation is of essentially normal MRI.    We also reviewed images and report of nerve studies of the patient's right upper extremity.  Interpretation is normal nerve conduction study of both upper extremities.      Assessment    Assessment:  1. Right shoulder pain, unspecified chronicity    2. Right hand pain    3. Cervicalgia    4. Numbness and tingling of right upper extremity        Plan:  1. Recommend over the counter anti-inflammatories for pain and/or swelling  2. Cervicalgia with numbness and tingling of the right upper extremity in the face of normal objective studies--patient symptoms are likely coming from neuropathy associated with her diabetes.  I recommended she see an endocrinologist or  someone well versed in managing her diabetes which her PCP very well may be perfectly qualified.  I have told her and her daughter and her  today quite clearly that there is no surgery necessary for her.  She should follow-up as needed going forward.      Patricio Boudreaux MD  08/26/21  18:29 REINALDOT      Dragon disclaimer:  Much of this encounter note is an electronic transcription/translation of spoken language to printed text. The electronic translation of spoken language may permit erroneous, or at times, nonsensical words or phrases to be inadvertently transcribed; Although I have reviewed the note for such errors, some may still exist.

## 2021-10-22 ENCOUNTER — OFFICE VISIT (OUTPATIENT)
Dept: CARDIOLOGY | Facility: CLINIC | Age: 41
End: 2021-10-22

## 2021-10-22 VITALS
HEIGHT: 62 IN | DIASTOLIC BLOOD PRESSURE: 72 MMHG | WEIGHT: 140 LBS | OXYGEN SATURATION: 99 % | SYSTOLIC BLOOD PRESSURE: 132 MMHG | BODY MASS INDEX: 25.76 KG/M2 | HEART RATE: 73 BPM

## 2021-10-22 DIAGNOSIS — I10 ESSENTIAL HYPERTENSION: ICD-10-CM

## 2021-10-22 DIAGNOSIS — E78.5 DYSLIPIDEMIA: ICD-10-CM

## 2021-10-22 DIAGNOSIS — I49.3 PVC (PREMATURE VENTRICULAR CONTRACTION): Primary | ICD-10-CM

## 2021-10-22 PROCEDURE — 99214 OFFICE O/P EST MOD 30 MIN: CPT | Performed by: INTERNAL MEDICINE

## 2021-10-22 RX ORDER — DULAGLUTIDE 0.75 MG/.5ML
0.75 INJECTION, SOLUTION SUBCUTANEOUS WEEKLY
COMMUNITY
End: 2022-01-13 | Stop reason: CLARIF

## 2021-10-22 NOTE — PROGRESS NOTES
"[facility]    Encounter Date: 10/22/2021    Patient ID: Molly Joshua is a 41 y.o. female.  : 1980     PCP: Bashir Das MD       Chief Complaint: PVC (premature ventricular contraction)      PROBLEM LIST:  1. Premature ventricular contractions: .   a. Hospitalized at [facility], 10/17/2013, with 1-2 week history of dizziness and near syncope, noted to have runs of wide QRS complex tachycardia felt to be left ventricular outflow tract VT.   b. Treated with Flecainide.   c. Echocardiogram during her pregnancy in  or  was reportedly unremarkable.   d. Echocardiogram at [facility], 10/18/2013, showed normal left ventricular size and function. Ejection fraction 55% to 60%. No significant valvular abnormalities.   e. Echocardiogram, 2016, showed normal LV systolic function with ejection fraction greater than 65%. Trace mitral and trace tricuspid regurgitation.  f. A 24-hour Holter 2016 showed sinus rhythm with very frequent PVCs (12% of QRS complexes).  g. Treadmill stress test, 2016: Remarkable for average exercise capacity, normal hemodynamic response. Negative test for angina or ischemic ST segment changes. No exercise-induced arrhythmias although frequent PVCs were noted at rest which improved with exercise.  h. EP study With identification of 2 PVCs (posterior Left ventricular wall blow mitral valve annulus- associated with NSVT, successfully ablated) second PVC felt to be deep seated and would potentially require epicardial ablation. 16  i. 24 hour Holter monitor 2019: Average HR 68 bpm ( bpm), rare PVC's  j. Echocardiogram 10/11/19: EF 60%, mild MR/TR  2. Anxiety and panic disorder.   3. Chronic back pain.   4. \"Hypertensive heart disease\" as noted in her records. No documentation available.   5. Family history of coronary artery disease, father has had stents.   6. Dyslipidemia.   7. DM2 "     History of Present Illness  Patient presents today for a follow-up with a history of frequent PVCs and cardiac risk factors. Since last visit, she has been feeling well overall from a cardiovascular standpoint. Patient denies chest pain, shortness of breath, palpitations, edema, dizziness, and syncope. She notes she skipped a dose of lisinopril and her blood pressure increased to 168 mmHg systolic and subsequently after taking the medication this improved. Her A1c increased to 8.1% and her metformin was increased to 1 g bid but this caused her stomach discomfort she this was returned to 500 mg bid. She was started on Trulicity injections once weekly. She does not exercise regularly but remains active with household chores and work.  She asks why she needs to continue to follow-up with a PA in EP clinic while she is also seeing me regularly.    Allergies   Allergen Reactions   • Iodopropynyl Butylcarbamate [Iodopropynyl] Itching   • Metformin Diarrhea and GI Intolerance         Current Outpatient Medications:   •  cetirizine (zyrTEC) 10 MG tablet, Take 10 mg by mouth Daily., Disp: , Rfl:   •  Dulaglutide (Trulicity) 0.75 MG/0.5ML solution pen-injector, Inject 0.75 mg under the skin into the appropriate area as directed 1 (One) Time Per Week., Disp: , Rfl:   •  escitalopram (LEXAPRO) 10 MG tablet, Take 1 tablet by mouth Daily. (Patient taking differently: Take 20 mg by mouth Daily.), Disp: 30 tablet, Rfl: 6  •  ezetimibe (ZETIA) 10 MG tablet, Take 10 mg by mouth Daily., Disp: , Rfl:   •  flecainide (TAMBOCOR) 100 MG tablet, Take 1 tablet by mouth 2 (Two) Times a Day., Disp: 180 tablet, Rfl: 3  •  lisinopril (PRINIVIL,ZESTRIL) 5 MG tablet, Take 1 tablet by mouth Daily., Disp: 30 tablet, Rfl: 11  •  rosuvastatin (CRESTOR) 20 MG tablet, Take 40 mg by mouth Daily., Disp: , Rfl:   •  metFORMIN (GLUCOPHAGE) 500 MG tablet, Take 500 mg by mouth 2 (Two) Times a Day With Meals., Disp: , Rfl:     The following portions of  "the patient's history were reviewed and updated as appropriate: allergies, current medications, past family history, past medical history, past social history, past surgical history and problem list.    ROS  Review of Systems   Constitution: Negative for chills, fever, fatigue, generalized weakness.   Cardiovascular: Negative for chest pain, dyspnea on exertion, leg swelling, palpitations, orthopnea, and syncope.   Respiratory: Negative for cough, shortness of breath, and wheezing.  HENT: Negative for ear pain, nosebleeds, and tinnitus.  Gastrointestinal: Negative for abdominal pain, constipation, diarrhea, nausea and vomiting.   Genitourinary: No urinary symptoms.  Musculoskeletal: Negative for muscle cramps.  Neurological: Negative for dizziness, headaches, loss of balance, numbness, and symptoms of stroke.  Psychiatric: Normal mental status.     All other systems reviewed and are negative.        Objective:     /72 (BP Location: Left arm, Patient Position: Sitting)   Pulse 73   Ht 157.5 cm (62\")   Wt 63.5 kg (140 lb)   SpO2 99%   BMI 25.61 kg/m²      Physical Exam  Constitutional: Patient appears well-developed and well-nourished.   HENT: HEENT exam unremarkable.   Neck: Neck supple. No JVD present. No carotid bruits.   Cardiovascular: Normal rate, regular rhythm and normal heart sounds. No murmur heard.   2+ symmetric pulses.   Pulmonary/Chest: Breath sounds normal. Does not exhibit tenderness.   Abdominal: Abdomen benign.   Musculoskeletal: Does not exhibit edema.   Neurological: Neurological exam unremarkable.   Vitals reviewed.    Data Review:     Lab date: 4/30/2021  • MATT: Normal  • HbA1c: 7.7%    Procedures       Assessment:      Diagnosis Plan   1. PVC (premature ventricular contraction)  Largely asymptomatic. Continue flecainide.  Most recent ECG had shown normal QT interval.   2. Essential hypertension  Well controlled.   3. Dyslipidemia  No data to review. Continue rosuvastatin and Zetia. "     Plan:   Stable cardiac status.  Continue current medications.   No need to follow-up with EP PA, will refer her back to electrophysiology service if felt necessary.  FU in 12 MO, sooner as needed.  Thank you for allowing us to participate in the care of your patient.     I, Dada Hines, attest that this documentation has been prepared under the direction and in the presence of Joseph Flower MD 10/22/2021    IJoseph MD, personally performed the services described in this documentation as scribed by the above named individual in my presence, and it is both accurate and complete.  10/25/2021  07:26 EDT        Please note that portions of this note may have been completed with a voice recognition program. Efforts were made to edit the dictations, but occasionally words are mistranscribed.

## 2022-01-04 ENCOUNTER — DOCUMENTATION (OUTPATIENT)
Dept: ENDOCRINOLOGY | Facility: CLINIC | Age: 42
End: 2022-01-04

## 2022-01-04 NOTE — PROGRESS NOTES
Patient not eligible to fill specialty medication at [facility]. Reason:     [place]  [place] patients have to sign for medications delivered which makes it difficult to coordinate shipping    Nida Myers CPhT  Pharmacy Care Coordinator  1/4/2022  09:20 EST

## 2022-01-13 ENCOUNTER — LAB (OUTPATIENT)
Dept: LAB | Facility: HOSPITAL | Age: 42
End: 2022-01-13

## 2022-01-13 ENCOUNTER — OFFICE VISIT (OUTPATIENT)
Dept: DIABETES SERVICES | Facility: HOSPITAL | Age: 42
End: 2022-01-13

## 2022-01-13 ENCOUNTER — OFFICE VISIT (OUTPATIENT)
Dept: ENDOCRINOLOGY | Facility: CLINIC | Age: 42
End: 2022-01-13

## 2022-01-13 VITALS
HEIGHT: 62 IN | DIASTOLIC BLOOD PRESSURE: 85 MMHG | BODY MASS INDEX: 25.76 KG/M2 | SYSTOLIC BLOOD PRESSURE: 122 MMHG | HEART RATE: 76 BPM | OXYGEN SATURATION: 100 % | WEIGHT: 140 LBS

## 2022-01-13 DIAGNOSIS — E11.49 TYPE 2 DIABETES MELLITUS WITH NEUROLOGICAL MANIFESTATIONS: ICD-10-CM

## 2022-01-13 DIAGNOSIS — E78.5 DYSLIPIDEMIA: ICD-10-CM

## 2022-01-13 DIAGNOSIS — E11.65 UNCONTROLLED TYPE 2 DIABETES MELLITUS WITH HYPERGLYCEMIA: Primary | ICD-10-CM

## 2022-01-13 DIAGNOSIS — I10 BENIGN HYPERTENSION: ICD-10-CM

## 2022-01-13 DIAGNOSIS — E11.65 UNCONTROLLED TYPE 2 DIABETES MELLITUS WITH HYPERGLYCEMIA: ICD-10-CM

## 2022-01-13 LAB
EXPIRATION DATE: NORMAL
EXPIRATION DATE: NORMAL
GLUCOSE BLDC GLUCOMTR-MCNC: 85 MG/DL (ref 70–130)
HBA1C MFR BLD: 6.7 %
Lab: NORMAL
Lab: NORMAL

## 2022-01-13 PROCEDURE — 82947 ASSAY GLUCOSE BLOOD QUANT: CPT | Performed by: INTERNAL MEDICINE

## 2022-01-13 PROCEDURE — G0108 DIAB MANAGE TRN  PER INDIV: HCPCS

## 2022-01-13 PROCEDURE — 99204 OFFICE O/P NEW MOD 45 MIN: CPT | Performed by: INTERNAL MEDICINE

## 2022-01-13 PROCEDURE — 83036 HEMOGLOBIN GLYCOSYLATED A1C: CPT | Performed by: INTERNAL MEDICINE

## 2022-01-13 PROCEDURE — 3044F HG A1C LEVEL LT 7.0%: CPT | Performed by: INTERNAL MEDICINE

## 2022-01-13 PROCEDURE — 84443 ASSAY THYROID STIM HORMONE: CPT

## 2022-01-13 PROCEDURE — 82043 UR ALBUMIN QUANTITATIVE: CPT

## 2022-01-13 PROCEDURE — 82570 ASSAY OF URINE CREATININE: CPT

## 2022-01-13 PROCEDURE — 80053 COMPREHEN METABOLIC PANEL: CPT

## 2022-01-13 RX ORDER — SEMAGLUTIDE 1.34 MG/ML
0.5 INJECTION, SOLUTION SUBCUTANEOUS WEEKLY
Qty: 1.5 ML | Refills: 5 | Status: SHIPPED | OUTPATIENT
Start: 2022-01-13 | End: 2022-07-01 | Stop reason: SDUPTHER

## 2022-01-13 RX ORDER — ESCITALOPRAM OXALATE 20 MG/1
20 TABLET ORAL DAILY
COMMUNITY
Start: 2021-11-03

## 2022-01-13 NOTE — PROGRESS NOTES
"     Office Note      Date: 2022  Patient Name: Molly Joshua  MRN: [id number]  : 1980    Chief Complaint   Patient presents with   • Diabetes     type 2       History of Present Illness:   Molly Joshua is a 41 y.o. female who presents for Diabetes type 2. Diagnosed in: 2019. Treated in past with oral agents.  She had trouble tolerating metformin.  Current treatments: trulicity. Number of insulin shots per day: none. Checks blood sugar none times a day. Has low blood sugar: no. Aspirin use: No - stopped by cardiologist.. Statin use: Yes. ACE-I/ARB use: Yes.  Last eye exam: 2021.    She hasn't had any formal DM education.  She notes occ tingling in her legs and restless legs.  She had some tingling in right hand and arm.  W/u for this including NCV was okay.    Subjective      Diabetic Complications:  Eyes: No  Kidneys: No  Feet: Yes - tingling in legs  Heart: No    Diet and Exercise:  Meals per day: 2  Minutes of exercise per week: 0 mins.    Review of Systems:   Review of Systems   Constitutional: Positive for fatigue.   HENT: Negative.    Eyes: Negative.    Respiratory: Negative.    Cardiovascular: Negative.    Gastrointestinal: Negative.         Heartburn/Reflux   Endocrine: Negative.    Genitourinary: Negative.    Musculoskeletal: Positive for arthralgias.   Skin: Negative.    Allergic/Immunologic: Positive for food allergies.   Neurological: Negative.    Hematological: Negative.    Psychiatric/Behavioral: Negative.        The following portions of the patient's history were reviewed and updated as appropriate: allergies, current medications, past family history, past medical history, past social history, past surgical history and problem list.    Objective     Visit Vitals  /85   Pulse 76   Ht 157.5 cm (62\")   Wt 63.5 kg (140 lb)   SpO2 100%   BMI 25.61 kg/m²       Physical Exam:  Physical Exam  Constitutional:       Appearance: Normal appearance.   HENT:      Head: " Normocephalic and atraumatic.   Eyes:      Extraocular Movements: Extraocular movements intact.      Conjunctiva/sclera: Conjunctivae normal.      Pupils: Pupils are equal, round, and reactive to light.   Neck:      Thyroid: No thyroid mass, thyromegaly or thyroid tenderness.   Cardiovascular:      Rate and Rhythm: Normal rate and regular rhythm.      Pulses: Normal pulses.           Dorsalis pedis pulses are 2+ on the left side.        Posterior tibial pulses are 2+ on the right side and 2+ on the left side.      Heart sounds: Normal heart sounds.   Pulmonary:      Effort: Pulmonary effort is normal.      Breath sounds: Normal breath sounds.   Abdominal:      General: Bowel sounds are normal.      Palpations: Abdomen is soft.   Musculoskeletal:         General: Normal range of motion.      Cervical back: Normal range of motion and neck supple.   Feet:      Right foot:      Protective Sensation: 5 sites tested. 5 sites sensed.      Skin integrity: Skin integrity normal.      Toenail Condition: Right toenails are normal.      Left foot:      Protective Sensation: 5 sites tested. 5 sites sensed.      Skin integrity: Skin integrity normal.      Toenail Condition: Left toenails are normal.   Lymphadenopathy:      Cervical: No cervical adenopathy.   Skin:     General: Skin is warm and dry.   Neurological:      General: No focal deficit present.      Mental Status: She is alert.   Psychiatric:         Mood and Affect: Mood normal.         Behavior: Behavior normal.         Thought Content: Thought content normal.         Judgment: Judgment normal.         Labs:    HbA1c  Hemoglobin A1C   Date Value Ref Range Status   01/13/2022 6.7 % Final   .    CMP  Lab Results   Component Value Date    GLUCOSE 123 (H) 06/01/2016    BUN 11 06/01/2016    CREATININE 0.7 06/01/2016    K 3.8 06/01/2016    CO2 32 (H) 06/01/2016    CALCIUM 9.5 06/01/2016        Lipid Panel        TSH  No results found for: TSH, FREET4     Hemoglobin A1C  Lab  Results   Component Value Date    HGBA1C 6.7 01/13/2022        Microalbumin/Creatinine  No results found for: MALBCRERATI        Assessment / Plan      Assessment & Plan:  Diagnoses and all orders for this visit:    1. Uncontrolled type 2 diabetes mellitus with hyperglycemia (HCC) (Primary)  Assessment & Plan:  Diabetes is improving with treatment.  A1c looks good at 6.7%.  Continue current treatment regimen.  But change trulicity to ozempic for insurance purposes.    Diabetes will be reassessed in 3 months.    Orders:  -     POC Glucose, Blood  -     POC Glycosylated Hemoglobin (Hb A1C)  -     Comprehensive Metabolic Panel; Future  -     Microalbumin / Creatinine Urine Ratio - Urine, Clean Catch; Future  -     TSH; Future  -     Ambulatory Referral to Diabetic Education    2. Benign hypertension  Assessment & Plan:  Hypertension is improving with treatment.  Continue current treatment regimen.  Blood pressure will be reassessed at the next regular appointment.      3. Dyslipidemia  Assessment & Plan:  Continue statin and zetia.        4. 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  She is getting B12 injections currently.      Other orders  -     Semaglutide,0.25 or 0.5MG/DOS, (Ozempic, 0.25 or 0.5 MG/DOSE,) 2 MG/1.5ML solution pen-injector; Inject 0.5 mg under the skin into the appropriate area as directed 1 (One) Time Per Week.  Dispense: 1.5 mL; Refill: 5       Return in about 3 months (around 4/13/2022) for Recheck with A1c.    Jorge Eastman MD   01/13/2022

## 2022-01-13 NOTE — ASSESSMENT & PLAN NOTE
Diabetes is improving with treatment.  A1c looks good at 6.7%.  Continue current treatment regimen.  But change trulicity to ozempic for insurance purposes.    Diabetes will be reassessed in 3 months.

## 2022-01-13 NOTE — CONSULTS
Diabetes Education    Patient Name:  Molly Joshua  YOB: 1980  MRN: [id number]  Admit Date:  (Not on file)        30min DM education provided as walk in appt. Primarily focused on medications and SMBG. Pt receptive to education and engaged in discussion. Please see full notes under Media tab. Thank you for the referral.      Electronically signed by:  Kristie Roque  01/13/22 16:26 EST

## 2022-01-14 LAB
ALBUMIN SERPL-MCNC: 4.6 G/DL (ref 3.5–5.2)
ALBUMIN UR-MCNC: <1.2 MG/DL
ALBUMIN/GLOB SERPL: 1.3 G/DL
ALP SERPL-CCNC: 113 U/L (ref 39–117)
ALT SERPL W P-5'-P-CCNC: 30 U/L (ref 1–33)
ANION GAP SERPL CALCULATED.3IONS-SCNC: 11.4 MMOL/L (ref 5–15)
AST SERPL-CCNC: 22 U/L (ref 1–32)
BILIRUB SERPL-MCNC: 0.3 MG/DL (ref 0–1.2)
BUN SERPL-MCNC: 14 MG/DL (ref 6–20)
BUN/CREAT SERPL: 21.5 (ref 7–25)
CALCIUM SPEC-SCNC: 9.9 MG/DL (ref 8.6–10.5)
CHLORIDE SERPL-SCNC: 102 MMOL/L (ref 98–107)
CO2 SERPL-SCNC: 22.6 MMOL/L (ref 22–29)
CREAT SERPL-MCNC: 0.65 MG/DL (ref 0.57–1)
CREAT UR-MCNC: 134.2 MG/DL
GFR SERPL CREATININE-BSD FRML MDRD: 100 ML/MIN/1.73
GFR SERPL CREATININE-BSD FRML MDRD: 121 ML/MIN/1.73
GLOBULIN UR ELPH-MCNC: 3.5 GM/DL
GLUCOSE SERPL-MCNC: 88 MG/DL (ref 65–99)
MICROALBUMIN/CREAT UR: NORMAL MG/G{CREAT}
POTASSIUM SERPL-SCNC: 4.3 MMOL/L (ref 3.5–5.2)
PROT SERPL-MCNC: 8.1 G/DL (ref 6–8.5)
SODIUM SERPL-SCNC: 136 MMOL/L (ref 136–145)
TSH SERPL DL<=0.05 MIU/L-ACNC: 1.57 UIU/ML (ref 0.27–4.2)

## 2022-02-02 ENCOUNTER — OFFICE VISIT (OUTPATIENT)
Dept: CARDIOLOGY | Facility: CLINIC | Age: 42
End: 2022-02-02

## 2022-02-02 VITALS
SYSTOLIC BLOOD PRESSURE: 110 MMHG | HEIGHT: 62 IN | WEIGHT: 141.4 LBS | DIASTOLIC BLOOD PRESSURE: 70 MMHG | BODY MASS INDEX: 26.02 KG/M2 | HEART RATE: 65 BPM | OXYGEN SATURATION: 99 %

## 2022-02-02 DIAGNOSIS — I49.3 PVC (PREMATURE VENTRICULAR CONTRACTION): Primary | ICD-10-CM

## 2022-02-02 DIAGNOSIS — I11.9 HYPERTENSIVE HEART DISEASE WITHOUT HEART FAILURE: ICD-10-CM

## 2022-02-02 PROCEDURE — 93000 ELECTROCARDIOGRAM COMPLETE: CPT | Performed by: INTERNAL MEDICINE

## 2022-02-02 PROCEDURE — 99213 OFFICE O/P EST LOW 20 MIN: CPT | Performed by: INTERNAL MEDICINE

## 2022-02-02 NOTE — PROGRESS NOTES
"Molly Mallory Josiane  1980  [phone]    02/02/2022    [facility] CARDIOLOGY     Referring Provider: No ref. provider found     Bashir Das MD  [address] [phone]    Chief Complaint   Patient presents with   • PVC (premature ventricular contraction)       Problem List:      1. Premature ventricular contractions: .   a. Hospitalized at [facility], 10/17/2013, with 1-2 week history of dizziness and near syncope, noted to have runs of wide QRS complex tachycardia felt to be left ventricular outflow tract VT.   b. Treated with Flecainide.   c. Echocardiogram during her pregnancy in 2001 or 2002 was reportedly unremarkable.   d. Echocardiogram at [facility], 10/18/2013, showed normal left ventricular size and function. Ejection fraction 55% to 60%. No significant valvular abnormalities.   e. Echocardiogram, 02/22/2016, showed normal LV systolic function with ejection fraction greater than 65%. Trace mitral and trace tricuspid regurgitation.  f. A 24-hour Holter 01/29/2016 showed sinus rhythm with very frequent PVCs (12% of QRS complexes).  g. Treadmill stress test, 02/22/2016: Remarkable for average exercise capacity, normal hemodynamic response. Negative test for angina or ischemic ST segment changes. No exercise-induced arrhythmias although frequent PVCs were noted at rest which improved with exercise.  h. Ep study with identification of 2 PVCs (posterior Left ventricular wall blow mitral valve annulus- associated with NSVT, successfully ablated) second PVC felt to be deep seated and would potentially require epicardial ablation. 6/2/16  i. 24 hour Holter monitor 9/6/2019: Average HR 68 bpm ( bpm), rare PVC's  j. Echocardiogram 10/11/19: EF 60%, mild MR/TR  2. Anxiety and panic disorder.   3. Chronic back pain.   4. \"Hypertensive heart disease\" as noted in her records. No documentation available.   5. Family history of coronary " "artery disease, father has had stents.   6. Dyslipidemia.   7.   DM2   Allergies  Allergies   Allergen Reactions   • Black [place] Pollen Hives   • Garlic Hives   • Milk-Related Compounds Hives   • Wheat Hives   • Iodopropynyl Butylcarbamate [Iodopropynyl] Itching   • Metformin Diarrhea and GI Intolerance       Current Medications    Current Outpatient Medications:   •  cetirizine (zyrTEC) 10 MG tablet, Take 10 mg by mouth Daily., Disp: , Rfl:   •  escitalopram (LEXAPRO) 20 MG tablet, 20 mg Daily., Disp: , Rfl:   •  ezetimibe (ZETIA) 10 MG tablet, Take 10 mg by mouth Daily., Disp: , Rfl:   •  flecainide (TAMBOCOR) 100 MG tablet, Take 1 tablet by mouth 2 (Two) Times a Day., Disp: 180 tablet, Rfl: 3  •  lisinopril (PRINIVIL,ZESTRIL) 5 MG tablet, Take 1 tablet by mouth Daily., Disp: 30 tablet, Rfl: 11  •  Semaglutide,0.25 or 0.5MG/DOS, (Ozempic, 0.25 or 0.5 MG/DOSE,) 2 MG/1.5ML solution pen-injector, Inject 0.5 mg under the skin into the appropriate area as directed 1 (One) Time Per Week., Disp: 1.5 mL, Rfl: 5    History of Present Illness     Pt presents for follow up of PVC/HTN. Since we last saw the pt, pt denies any palps, SOB, CP, LH, and dizziness. Denies any hospitalizations, ER visits, bleeding, or TIA/CVA symptoms. Overall feels well. Dealing with insomnia. BP stable at home    ROS:  General:  Denies fatigue, weight gain or loss  Cardiovascular:  Denies CP, PND, syncope, near syncope, edema or palpitations.  Pulmonary:  Denies HOLLAND, cough, or wheezing      Vitals:    02/02/22 1522   BP: 110/70   BP Location: Right arm   Patient Position: Sitting   Pulse: 65   SpO2: 99%   Weight: 64.1 kg (141 lb 6.4 oz)   Height: 157.5 cm (62\")     Body mass index is 25.86 kg/m².  PE:  General: NAD  Neck: no JVD, no carotid bruits, no TM  Heart RRR, NL S1, S2, S4 present, no rubs, murmurs  Lungs: CTA, no wheezes, rhonchi, or rales  Abd: soft, non-tender, NL BS  Ext: No musculoskeletal deformities, no edema, cyanosis, or " clubbing  Psych: normal mood and affect    Diagnostic Data:        ECG 12 Lead    Date/Time: 2/2/2022 3:51 PM  Performed by: Chance Kelly MD  Authorized by: Chance Kelly MD   Comparison: compared with previous ECG from 8/2/2021  Similar to previous ECG  Rhythm: sinus rhythm  BPM: 65              1. PVC (premature ventricular contraction)    2. Hypertensive heart disease without heart failure          Plan:    1) PVC's:  - Well controlled on Flecainide.  QRS normal.    - Continue present medications     2) HTN:  - Well controlled on Lisinopril  - Wt loss, exercise, salt reduction    F/up in 12 months

## 2022-03-15 ENCOUNTER — EDUCATION (OUTPATIENT)
Dept: DIABETES SERVICES | Facility: HOSPITAL | Age: 42
End: 2022-03-15

## 2022-03-15 NOTE — CONSULTS
Diabetes Education    Patient Name:  Molly Joshua  YOB: 1980  MRN: [id number]  Admit Date:  (Not on file)        Diabetes education follow up visit completed, 15 min phone call w/ pt. Please see media tab for full note. Thank you for the referral!      Electronically signed by:  Chela Smith RN, [facility]  03/15/22 15:14 EDT

## 2022-07-01 ENCOUNTER — OFFICE VISIT (OUTPATIENT)
Dept: ENDOCRINOLOGY | Facility: CLINIC | Age: 42
End: 2022-07-01

## 2022-07-01 VITALS
OXYGEN SATURATION: 100 % | HEIGHT: 62 IN | DIASTOLIC BLOOD PRESSURE: 68 MMHG | WEIGHT: 136 LBS | BODY MASS INDEX: 25.03 KG/M2 | HEART RATE: 74 BPM | SYSTOLIC BLOOD PRESSURE: 110 MMHG

## 2022-07-01 DIAGNOSIS — I10 BENIGN HYPERTENSION: ICD-10-CM

## 2022-07-01 DIAGNOSIS — E11.65 UNCONTROLLED TYPE 2 DIABETES MELLITUS WITH HYPERGLYCEMIA: Primary | ICD-10-CM

## 2022-07-01 DIAGNOSIS — E11.49 TYPE 2 DIABETES MELLITUS WITH NEUROLOGICAL MANIFESTATIONS: ICD-10-CM

## 2022-07-01 DIAGNOSIS — E78.5 DYSLIPIDEMIA: ICD-10-CM

## 2022-07-01 LAB
EXPIRATION DATE: NORMAL
EXPIRATION DATE: NORMAL
GLUCOSE BLDC GLUCOMTR-MCNC: 113 MG/DL (ref 70–130)
HBA1C MFR BLD: 5.8 %
Lab: NORMAL
Lab: NORMAL

## 2022-07-01 PROCEDURE — 82947 ASSAY GLUCOSE BLOOD QUANT: CPT | Performed by: INTERNAL MEDICINE

## 2022-07-01 PROCEDURE — 3044F HG A1C LEVEL LT 7.0%: CPT | Performed by: INTERNAL MEDICINE

## 2022-07-01 PROCEDURE — 99214 OFFICE O/P EST MOD 30 MIN: CPT | Performed by: INTERNAL MEDICINE

## 2022-07-01 PROCEDURE — 83036 HEMOGLOBIN GLYCOSYLATED A1C: CPT | Performed by: INTERNAL MEDICINE

## 2022-07-01 RX ORDER — ROSUVASTATIN CALCIUM 40 MG/1
40 TABLET, COATED ORAL DAILY
Start: 2022-07-01

## 2022-07-01 RX ORDER — SEMAGLUTIDE 1.34 MG/ML
0.5 INJECTION, SOLUTION SUBCUTANEOUS WEEKLY
Qty: 4.5 ML | Refills: 3 | Status: SHIPPED | OUTPATIENT
Start: 2022-07-01

## 2022-07-01 RX ORDER — CHOLECALCIFEROL (VITAMIN D3) 1250 MCG
CAPSULE ORAL
COMMUNITY
Start: 2022-06-13

## 2022-07-01 RX ORDER — OMEPRAZOLE 40 MG/1
40 CAPSULE, DELAYED RELEASE ORAL DAILY
COMMUNITY

## 2022-07-01 NOTE — PROGRESS NOTES
"     Office Note      Date: 2022  Patient Name: Molly Joshua  MRN: [id number]  : 1980    Chief Complaint   Patient presents with   • Diabetes       History of Present Illness:   Molly Joshua is a 42 y.o. female who presents for Diabetes type 2. Diagnosed in: 2019. Treated in past with oral agents.  She had trouble tolerating metformin.  Current treatments: ozempic. Number of insulin shots per day: none. Checks blood sugar PRN. Has low blood sugar: no. Aspirin use: No - stopped by cardiologist.. Statin use: Yes - on ezetimibe. ACE-I/ARB use: Yes.  Change in health since last visit: none. Last eye exam: 2021.    Subjective      Diabetic Complications:  Eyes: No  Kidneys: No  Feet: Yes - tingling in legs  Heart: No    Diet and Exercise:  Meals per day: 2  Minutes of exercise per week: 0 mins.    Review of Systems:   Review of Systems   Constitutional: Positive for fatigue.   Cardiovascular: Negative.    Gastrointestinal: Negative.    Endocrine: Negative.        The following portions of the patient's history were reviewed and updated as appropriate: allergies, current medications, past family history, past medical history, past social history, past surgical history and problem list.    Objective       Visit Vitals  /68   Pulse 74   Ht 157.5 cm (62\")   Wt 61.7 kg (136 lb)   SpO2 100%   BMI 24.87 kg/m²       Physical Exam:  Physical Exam  Constitutional:       Appearance: Normal appearance.   Neurological:      Mental Status: She is alert.         Labs:    HbA1c  Lab Results   Component Value Date    HGBA1C 5.8 2022       CMP  Lab Results   Component Value Date    GLUCOSE 88 2022    BUN 14 2022    CREATININE 0.65 2022    EGFRIFNONA 100 2022    EGFRIFAFRI 121 2022    BCR 21.5 2022    K 4.3 2022    CO2 22.6 2022    CALCIUM 9.9 2022    AST 22 2022    ALT 30 2022        Lipid Panel        TSH  Lab Results "   Component Value Date    TSH 1.570 01/13/2022        Hemoglobin A1C  Lab Results   Component Value Date    HGBA1C 5.8 07/01/2022        Microalbumin/Creatinine  Lab Results   Component Value Date    MALBCREVANNESAO  01/13/2022      Comment:      Unable to calculate    MICROALBUR <1.2 01/13/2022           Assessment / Plan      Assessment & Plan:  Diagnoses and all orders for this visit:    1. Uncontrolled type 2 diabetes mellitus with hyperglycemia (HCC) (Primary)  Assessment & Plan:  Diabetes is improving with treatment.   Continue current treatment regimen.  Diabetes will be reassessed in 3 months.    Orders:  -     POC Glucose, Blood  -     POC Glycosylated Hemoglobin (Hb A1C)    2. 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 Benign hypertension  Assessment & Plan:  Hypertension is unchanged.  Continue current treatment regimen.  Blood pressure will be reassessed at the next regular appointment.      4. Dyslipidemia  Assessment & Plan:  Continue statin and ezetimibe.      Other orders  -     rosuvastatin (CRESTOR) 40 MG tablet; Take 1 tablet by mouth Daily.  -     Semaglutide,0.25 or 0.5MG/DOS, (Ozempic, 0.25 or 0.5 MG/DOSE,) 2 MG/1.5ML solution pen-injector; Inject 0.5 mg under the skin into the appropriate area as directed 1 (One) Time Per Week.  Dispense: 4.5 mL; Refill: 3      Return in about 3 months (around 10/1/2022) for Recheck with A1c.    Jorge Eastman MD   07/01/2022

## 2022-08-29 ENCOUNTER — EDUCATION (OUTPATIENT)
Dept: DIABETES SERVICES | Facility: HOSPITAL | Age: 42
End: 2022-08-29

## 2022-08-29 NOTE — CONSULTS
Diabetes Education    Patient Name:  Molly Joshua  YOB: 1980  MRN: [id number]  Admit Date:  (Not on file)      Patient telephoned today for 6 mo diabetes education followup. Please see media tab for assessment and notes if you use EPIC. If you are not an EPIC user a copy of patient's assessment and notes will be sent per routine. Thank you.     Electronically signed by:  Mallory Pretty RN  08/29/22 12:34 EDT

## 2022-11-29 ENCOUNTER — OFFICE VISIT (OUTPATIENT)
Dept: ENDOCRINOLOGY | Facility: CLINIC | Age: 42
End: 2022-11-29

## 2022-11-29 VITALS
DIASTOLIC BLOOD PRESSURE: 72 MMHG | BODY MASS INDEX: 24.84 KG/M2 | HEIGHT: 62 IN | SYSTOLIC BLOOD PRESSURE: 112 MMHG | WEIGHT: 135 LBS

## 2022-11-29 DIAGNOSIS — E78.5 DYSLIPIDEMIA: ICD-10-CM

## 2022-11-29 DIAGNOSIS — E11.65 UNCONTROLLED TYPE 2 DIABETES MELLITUS WITH HYPERGLYCEMIA: Primary | ICD-10-CM

## 2022-11-29 DIAGNOSIS — I10 BENIGN HYPERTENSION: ICD-10-CM

## 2022-11-29 LAB
EXPIRATION DATE: ABNORMAL
GLUCOSE BLDC GLUCOMTR-MCNC: 158 MG/DL (ref 70–130)
Lab: ABNORMAL

## 2022-11-29 PROCEDURE — 82947 ASSAY GLUCOSE BLOOD QUANT: CPT | Performed by: INTERNAL MEDICINE

## 2022-11-29 PROCEDURE — 99214 OFFICE O/P EST MOD 30 MIN: CPT | Performed by: INTERNAL MEDICINE

## 2022-11-29 RX ORDER — ONDANSETRON 4 MG/1
4 TABLET, FILM COATED ORAL DAILY
COMMUNITY
Start: 2022-11-16 | End: 2023-02-08

## 2022-11-29 RX ORDER — FAMOTIDINE 20 MG/1
20 TABLET, FILM COATED ORAL DAILY
COMMUNITY
Start: 2022-09-26 | End: 2022-11-29 | Stop reason: ALTCHOICE

## 2022-11-29 NOTE — PROGRESS NOTES
"     Office Note      Date: 2022  Patient Name: Molly Joshua  MRN: [id number]  : 1980    Chief Complaint   Patient presents with   • Diabetes     Type II       History of Present Illness:   Molly Joshua is a 42 y.o. female who presents for Diabetes type 2. Diagnosed in: 2019. Treated in past with oral agents.  She had trouble tolerating metformin.  Current treatments: ozempic. Number of insulin shots per day: none. Checks blood sugar PRN. Has low blood sugar: no. Aspirin use: No - stopped by cardiologist.. Statin use: Yes - on ezetimibe. ACE-I/ARB use: Yes.  Change in health since last visit: none. Last eye exam: 2021.    Subjective      Diabetic Complications:  Eyes: No  Kidneys: No  Feet: Yes - tingling in legs  Heart: No    Diet and Exercise:  Meals per day: 2  Minutes of exercise per week: 0 mins.    Review of Systems:   Review of Systems   Constitutional: Positive for fatigue.   Cardiovascular: Negative.    Gastrointestinal: Positive for nausea.   Endocrine: Negative.        The following portions of the patient's history were reviewed and updated as appropriate: allergies, current medications, past family history, past medical history, past social history, past surgical history and problem list.    Objective       Visit Vitals  /72 (BP Location: Left arm, Patient Position: Sitting, Cuff Size: Adult)   Ht 157.5 cm (62\")   Wt 61.2 kg (135 lb)   BMI 24.69 kg/m²       Physical Exam:  Physical Exam  Constitutional:       Appearance: Normal appearance.   Neurological:      Mental Status: She is alert.         Labs:    HbA1c  Lab Results   Component Value Date    HGBA1C 5.8 2022       CMP  Lab Results   Component Value Date    GLUCOSE 88 2022    BUN 14 2022    CREATININE 0.65 2022    EGFRIFNONA 100 2022    EGFRIFAFRI 121 2022    BCR 21.5 2022    K 4.3 2022    CO2 22.6 2022    CALCIUM 9.9 2022    AST 22 2022    " ALT 30 01/13/2022        Lipid Panel        TSH  Lab Results   Component Value Date    TSH 1.570 01/13/2022        Hemoglobin A1C  Lab Results   Component Value Date    HGBA1C 5.8 07/01/2022        Microalbumin/Creatinine  Lab Results   Component Value Date    MALBCRERATIO  01/13/2022      Comment:      Unable to calculate    MICROALBUR <1.2 01/13/2022           Assessment / Plan      Assessment & Plan:  Diagnoses and all orders for this visit:    1. Uncontrolled type 2 diabetes mellitus with hyperglycemia (HCC) (Primary)  Assessment & Plan:  Continue current tx but decrease ozempic to see if this helps with nausea.    She reports recent A1c was 6.1%.    Orders:  -     POC Glucose, Blood  -     Cancel: Hemoglobin A1c; Future    2. Benign hypertension  Assessment & Plan:  Hypertension is unchanged.  Continue current treatment regimen.  Blood pressure will be reassessed at the next regular appointment.      3. Dyslipidemia  Assessment & Plan:  Continue statin and ezetimibe.  Plan to check lipids next visit.        Return in about 3 months (around 2/28/2023) for Recheck with A1c, CMP, lipids, TSH, microalbumin, foot exam.    Jorge Eastman MD   11/29/2022

## 2022-11-29 NOTE — ASSESSMENT & PLAN NOTE
Continue current tx but decrease ozempic to see if this helps with nausea.    She reports recent A1c was 6.1%.  
Continue statin and ezetimibe.  Plan to check lipids next visit.  
Hypertension is unchanged.  Continue current treatment regimen.  Blood pressure will be reassessed at the next regular appointment.  
(4) no impairment

## 2023-01-27 ENCOUNTER — OFFICE VISIT (OUTPATIENT)
Dept: CARDIOLOGY | Facility: CLINIC | Age: 43
End: 2023-01-27
Payer: COMMERCIAL

## 2023-01-27 VITALS
OXYGEN SATURATION: 99 % | DIASTOLIC BLOOD PRESSURE: 68 MMHG | SYSTOLIC BLOOD PRESSURE: 136 MMHG | WEIGHT: 140 LBS | BODY MASS INDEX: 25.76 KG/M2 | HEART RATE: 76 BPM | HEIGHT: 62 IN

## 2023-01-27 DIAGNOSIS — I10 ESSENTIAL HYPERTENSION: ICD-10-CM

## 2023-01-27 DIAGNOSIS — E78.5 DYSLIPIDEMIA: ICD-10-CM

## 2023-01-27 DIAGNOSIS — I49.3 PVC (PREMATURE VENTRICULAR CONTRACTION): Primary | ICD-10-CM

## 2023-01-27 PROCEDURE — 93000 ELECTROCARDIOGRAM COMPLETE: CPT | Performed by: INTERNAL MEDICINE

## 2023-01-27 PROCEDURE — 99214 OFFICE O/P EST MOD 30 MIN: CPT | Performed by: INTERNAL MEDICINE

## 2023-01-27 NOTE — PROGRESS NOTES
"[facility] Cardiology    Encounter Date: 2023    Patient ID: Molly Joshua is a 42 y.o. female.  : 1980     PCP: Bashir Das MD       Chief Complaint: Hypertension, Irregular Heart Beat, and PVC's      PROBLEM LIST:  1. Premature ventricular contractions: .   a. Hospitalized at [facility], 10/17/2013, with 1-2 week history of dizziness and near syncope, noted to have runs of wide QRS complex tachycardia felt to be left ventricular outflow tract VT.   b. Treated with Flecainide.   c. Echo during her pregnancy in  or  was reportedly unremarkable.   d. Echo at [facility], 10/18/2013, showed normal left ventricular size and function. Ejection fraction 55% to 60%. No significant valvular abnormalities.   e. Echo, 2016, showed normal LV systolic function with ejection fraction greater than 65%. Trace mitral and trace tricuspid regurgitation.  f. 24h Holter 2016 showed sinus rhythm with very frequent PVCs (12% of QRS complexes).  g. Treadmill stress test, 2016: Remarkable for average exercise capacity, normal hemodynamic response. Negative test for angina or ischemic ST segment changes. No exercise-induced arrhythmias although frequent PVCs were noted at rest which improved with exercise.  h. EP study With identification of 2 PVCs (posterior Left ventricular wall blow mitral valve annulus- associated with NSVT, successfully ablated) second PVC felt to be deep seated and would potentially require epicardial ablation. 2016  i. 24h Holter 2019: Average HR 68 bpm ( bpm), rare PVC's  j. Echo 10/11/2019: EF 60%, mild MR/TR  2. Anxiety and panic disorder.   3. Chronic back pain.   4. \"Hypertensive heart disease\" as noted in her records. No documentation available.   5. Family history of coronary artery disease, father has had stents.   6. Dyslipidemia.   7. DM2     History of Present Illness  Patient " presents today for a follow-up with a history of PVCs and cardiac risk factors after a long gap states that her appointments are rescheduled due to the pandemic and also because [place] was traveling [place].. Since last visit, patient has been doing well overall from a cardiovascular standpoint. She does not consume coffee or caffeine.  Remains active and busy and has been going to the gym.  She was started on Ozempic for better control of diabetes and has lost some weight.  No complaints of chest pain shortness of breath palpitations dizziness or syncope.      Allergies   Allergen Reactions   • Black [place] Pollen Hives   • Milk-Related Compounds Hives   • Wheat Hives   • Iodopropynyl Butylcarbamate [Iodopropynyl] Itching   • Metformin Diarrhea and GI Intolerance         Current Outpatient Medications:   •  cetirizine (zyrTEC) 10 MG tablet, Take 10 mg by mouth Daily., Disp: , Rfl:   •  escitalopram (LEXAPRO) 20 MG tablet, 20 mg Daily., Disp: , Rfl:   •  ezetimibe (ZETIA) 10 MG tablet, Take 10 mg by mouth Daily., Disp: , Rfl:   •  flecainide (TAMBOCOR) 100 MG tablet, Take 1 tablet by mouth 2 (Two) Times a Day., Disp: 180 tablet, Rfl: 3  •  lisinopril (PRINIVIL,ZESTRIL) 5 MG tablet, Take 1 tablet by mouth Daily., Disp: 30 tablet, Rfl: 11  •  omeprazole (priLOSEC) 40 MG capsule, Take 40 mg by mouth Daily., Disp: , Rfl:   •  ondansetron (ZOFRAN) 4 MG tablet, Take 4 mg by mouth Daily., Disp: , Rfl:   •  rosuvastatin (CRESTOR) 40 MG tablet, Take 1 tablet by mouth Daily., Disp: , Rfl:   •  Semaglutide,0.25 or 0.5MG/DOS, (Ozempic, 0.25 or 0.5 MG/DOSE,) 2 MG/1.5ML solution pen-injector, Inject 0.5 mg under the skin into the appropriate area as directed 1 (One) Time Per Week., Disp: 4.5 mL, Rfl: 3  •  Cholecalciferol (Vitamin D3) 1.25 MG (06514 UT) capsule, , Disp: , Rfl:     The following portions of the patient's history were reviewed and updated as appropriate: allergies, current medications, past family history, past  "medical history, past social history, past surgical history and problem list.    ROS  Review of Systems   14 point ROS negative except for that listed in the HPI.         Objective:     /68 (BP Location: Right arm, Patient Position: Sitting)   Pulse 76   Ht 157.5 cm (62.01\")   Wt 63.5 kg (140 lb)   SpO2 99%   BMI 25.60 kg/m²      Physical Exam  Constitutional: Patient appears well-developed and well-nourished.   HENT: HEENT exam unremarkable.   Neck: Neck supple. No JVD present. No carotid bruits.   Cardiovascular: Normal rate, regular rhythm and normal heart sounds. No murmur heard.   2+ symmetric pulses.   Pulmonary/Chest: Breath sounds normal. Does not exhibit tenderness.   Abdominal: Abdomen benign.   Musculoskeletal: Does not exhibit edema.   Neurological: Neurological exam unremarkable.   Vitals reviewed.    Data Review:   Lab Results   Component Value Date    GLUCOSE 88 01/13/2022    BUN 14 01/13/2022    CREATININE 0.65 01/13/2022    EGFRIFNONA 100 01/13/2022    EGFRIFAFRI 121 01/13/2022    BCR 21.5 01/13/2022     01/13/2022    K 4.3 01/13/2022    CO2 22.6 01/13/2022    CALCIUM 9.9 01/13/2022    ALBUMIN 4.60 01/13/2022    AST 22 01/13/2022    ALT 30 01/13/2022     Lab Results   Component Value Date    TSH 1.570 01/13/2022     Lab Results   Component Value Date    HGBA1C 5.8 07/01/2022          ECG 12 Lead    Date/Time: 1/27/2023 3:34 PM  Performed by: Joseph Flower MD  Authorized by: Joseph Flower MD   Comparison: compared with previous ECG from 2/22/2022  Similar to previous ECG  Rhythm: sinus rhythm  Comments: Nondiagnostic inferior and lateral Q waves, incomplete RBBB, normal QT interval              Assessment:      Diagnosis Plan   1. PVC (premature ventricular contraction)  Stable and asymptomatic. Continue on flecainide 100 mg for rhythm control.  QT interval is normal.   2. Essential hypertension  Well controlled. Continue on lisinopril 5 mg daily for hypertension.    3. Dyslipidemia  " No labs for review in visit. Continue on Zetia 10 mg daily and rosuvastatin 40 mg daily for hyperlipidemia.  Follow-up with PCP for close monitoring.     Plan:   Stable cardiac status.  No current cardiac symptoms.  Continue current medications.   FU with EP as scheduled, follow-up with me in 12 MO, sooner as needed.  Thank you for allowing us to participate in the care of your patient.     Scribed for Joseph Flower MD by Namita Mayorga. 1/27/2023 15:26 EST    I, Joseph Flower MD, personally performed the services described in this documentation as scribed by the above named individual in my presence, and it is both accurate and complete.  1/27/2023  15:37 EST      Please note that portions of this note may have been completed with a voice recognition program. Efforts were made to edit the dictations, but occasionally words are mistranscribed.

## 2023-02-08 ENCOUNTER — OFFICE VISIT (OUTPATIENT)
Dept: CARDIOLOGY | Facility: CLINIC | Age: 43
End: 2023-02-08
Payer: COMMERCIAL

## 2023-02-08 VITALS
HEART RATE: 76 BPM | BODY MASS INDEX: 21.66 KG/M2 | SYSTOLIC BLOOD PRESSURE: 122 MMHG | DIASTOLIC BLOOD PRESSURE: 82 MMHG | OXYGEN SATURATION: 100 % | HEIGHT: 67 IN | WEIGHT: 138 LBS

## 2023-02-08 DIAGNOSIS — I49.3 VENTRICULAR PREMATURE DEPOLARIZATION: Primary | ICD-10-CM

## 2023-02-08 DIAGNOSIS — I49.3 PVC (PREMATURE VENTRICULAR CONTRACTION): Primary | ICD-10-CM

## 2023-02-08 DIAGNOSIS — I49.3 PVC (PREMATURE VENTRICULAR CONTRACTION): ICD-10-CM

## 2023-02-08 DIAGNOSIS — R01.1 CARDIAC MURMUR: ICD-10-CM

## 2023-02-08 DIAGNOSIS — I11.9 HYPERTENSIVE HEART DISEASE WITHOUT HEART FAILURE: ICD-10-CM

## 2023-02-08 PROCEDURE — 93000 ELECTROCARDIOGRAM COMPLETE: CPT | Performed by: PHYSICIAN ASSISTANT

## 2023-02-08 PROCEDURE — 99213 OFFICE O/P EST LOW 20 MIN: CPT | Performed by: PHYSICIAN ASSISTANT

## 2023-02-08 NOTE — PROGRESS NOTES
"Molly Mallory Josiane  1980  [phone]    02/08/2023    [facility] CARDIOLOGY MAIN [facility]AMP[facility]     Referring Provider: No ref. provider found     Bashir Das MD  [address] [phone]    Chief Complaint   Patient presents with   • PVC (premature ventricular contraction)       Problem List:      1. Premature ventricular contractions: .   a. Hospitalized at [facility], 10/17/2013, with 1-2 week history of dizziness and near syncope, noted to have runs of wide QRS complex tachycardia felt to be left ventricular outflow tract VT.   b. Treated with Flecainide.   c. Echocardiogram during her pregnancy in 2001 or 2002 was reportedly unremarkable.   d. Echocardiogram at [facility], 10/18/2013, showed normal left ventricular size and function. Ejection fraction 55% to 60%. No significant valvular abnormalities.   e. Echocardiogram, 02/22/2016, showed normal LV systolic function with ejection fraction greater than 65%. Trace mitral and trace tricuspid regurgitation.  f. A 24-hour Holter 01/29/2016 showed sinus rhythm with very frequent PVCs (12% of QRS complexes).  g. Treadmill stress test, 02/22/2016: Remarkable for average exercise capacity, normal hemodynamic response. Negative test for angina or ischemic ST segment changes. No exercise-induced arrhythmias although frequent PVCs were noted at rest which improved with exercise.  h. Ep study with identification of 2 PVCs (posterior Left ventricular wall blow mitral valve annulus- associated with NSVT, successfully ablated) second PVC felt to be deep seated and would potentially require epicardial ablation. 6/2/16  i. 24 hour Holter monitor 9/6/2019: Average HR 68 bpm ( bpm), rare PVC's  j. Echocardiogram 10/11/19: EF 60%, mild MR/TR  2. Anxiety and panic disorder.   3. Chronic back pain.   4. \"Hypertensive heart disease\" as noted in her records. No documentation available.   5. Family history of " "coronary artery disease, father has had stents.   6. Dyslipidemia.   7.   DM2     Allergies  Allergies   Allergen Reactions   • Black [place] Pollen Hives   • Milk-Related Compounds Hives   • Wheat Hives   • Iodopropynyl Butylcarbamate [Iodopropynyl] Itching   • Metformin Diarrhea and GI Intolerance       Current Medications    Current Outpatient Medications:   •  cetirizine (zyrTEC) 10 MG tablet, Take 10 mg by mouth Daily., Disp: , Rfl:   •  Cholecalciferol (Vitamin D3) 1.25 MG (36816 UT) capsule, , Disp: , Rfl:   •  escitalopram (LEXAPRO) 20 MG tablet, 20 mg Daily., Disp: , Rfl:   •  ezetimibe (ZETIA) 10 MG tablet, Take 10 mg by mouth Daily., Disp: , Rfl:   •  flecainide (TAMBOCOR) 100 MG tablet, Take 1 tablet by mouth 2 (Two) Times a Day., Disp: 180 tablet, Rfl: 3  •  lisinopril (PRINIVIL,ZESTRIL) 5 MG tablet, Take 1 tablet by mouth Daily., Disp: 30 tablet, Rfl: 11  •  omeprazole (priLOSEC) 40 MG capsule, Take 40 mg by mouth Daily., Disp: , Rfl:   •  rosuvastatin (CRESTOR) 40 MG tablet, Take 1 tablet by mouth Daily., Disp: , Rfl:   •  Semaglutide,0.25 or 0.5MG/DOS, (Ozempic, 0.25 or 0.5 MG/DOSE,) 2 MG/1.5ML solution pen-injector, Inject 0.5 mg under the skin into the appropriate area as directed 1 (One) Time Per Week., Disp: 4.5 mL, Rfl: 3    History of Present Illness     Pt presents for follow up of PVC/HTN. Since we last saw the pt, pt denies any PVCs, SOB, CP, LH, and dizziness. Denies any ER visits, bleeding, or TIA/CVA symptoms. Overall feels well. BP is well controlled.     ROS:  General:  Denies fatigue, weight gain or loss  Cardiovascular:  Denies CP, PND, syncope, near syncope, edema or palpitations.  Pulmonary:  Denies HOLLAND, cough, or wheezing      Vitals:    02/08/23 1154   BP: 122/82   BP Location: Right arm   Patient Position: Sitting   Cuff Size: Adult   Pulse: 76   SpO2: 100%   Weight: 62.6 kg (138 lb)   Height: 170.2 cm (67\")     Body mass index is 21.61 kg/m².  PE:  General: NAD  Neck: no JVD, no " carotid bruits, no TM  Heart RRR, with ectopic beats NL S1, S2, S4 present, no rubs, + murmur  Lungs: CTA, no wheezes, rhonchi, or rales  Abd: soft, non-tender, NL BS  Ext: No musculoskeletal deformities, no edema, cyanosis, or clubbing  Psych: normal mood and affect    Diagnostic Data:        ECG 12 Lead    Date/Time: 2/8/2023 12:11 PM  Performed by: Carina Granado PA  Authorized by: Carina Granado PA   Comparison: compared with previous ECG from 1/27/2023  Comparison to previous ECG: Now with bigeminal PVCs  Rhythm: sinus rhythm  Ectopy: bigeminy  BPM: 76                1. PVC (premature ventricular contraction)    2. Hypertensive heart disease without heart failure          Plan:    1) PVC's:  - Well controlled on Flecainide overall with no symptoms on a daily basis. QRS normal.  EKG shows bigeminal PVCs today but she is asymptomatic.  - Continue present medications     2) HTN:  - Well controlled on Lisinopril  - Wt loss, exercise, salt reduction    3) Cardiac Murmur:  - murmur on exam today, last echo 2019 with mild MR/TR  -will order echocardiogram      F/up in 6 months      Electronically signed by GREG Mcgee, 02/08/23, 12:07 PM EST.

## 2023-03-01 ENCOUNTER — HOSPITAL ENCOUNTER (OUTPATIENT)
Dept: CARDIOLOGY | Facility: HOSPITAL | Age: 43
Discharge: HOME | End: 2023-03-01
Admitting: INTERNAL MEDICINE
Payer: COMMERCIAL

## 2023-03-01 VITALS — HEIGHT: 67 IN | BODY MASS INDEX: 21.66 KG/M2 | WEIGHT: 138.01 LBS

## 2023-03-01 DIAGNOSIS — R01.1 CARDIAC MURMUR: ICD-10-CM

## 2023-03-01 DIAGNOSIS — I49.3 VENTRICULAR PREMATURE DEPOLARIZATION: ICD-10-CM

## 2023-03-01 DIAGNOSIS — I49.3 PVC (PREMATURE VENTRICULAR CONTRACTION): ICD-10-CM

## 2023-03-01 PROCEDURE — 93306 TTE W/DOPPLER COMPLETE: CPT | Performed by: INTERNAL MEDICINE

## 2023-03-01 PROCEDURE — 93306 TTE W/DOPPLER COMPLETE: CPT

## 2023-03-02 LAB
BH CV ECHO MEAS - AO MAX PG: 14.3 MMHG
BH CV ECHO MEAS - AO MEAN PG: 8 MMHG
BH CV ECHO MEAS - AO ROOT DIAM: 2.4 CM
BH CV ECHO MEAS - AO V2 MAX: 189 CM/SEC
BH CV ECHO MEAS - AO V2 VTI: 37.9 CM
BH CV ECHO MEAS - AVA(I,D): 2.09 CM2
BH CV ECHO MEAS - EDV(CUBED): 74.1 ML
BH CV ECHO MEAS - EDV(MOD-SP2): 69.5 ML
BH CV ECHO MEAS - EDV(MOD-SP4): 86.8 ML
BH CV ECHO MEAS - EF(MOD-BP): 60.9 %
BH CV ECHO MEAS - EF(MOD-SP2): 57.6 %
BH CV ECHO MEAS - EF(MOD-SP4): 61.5 %
BH CV ECHO MEAS - ESV(CUBED): 24.4 ML
BH CV ECHO MEAS - ESV(MOD-SP2): 29.5 ML
BH CV ECHO MEAS - ESV(MOD-SP4): 33.4 ML
BH CV ECHO MEAS - FS: 31 %
BH CV ECHO MEAS - IVS/LVPW: 1.14 CM
BH CV ECHO MEAS - IVSD: 0.8 CM
BH CV ECHO MEAS - LA DIMENSION: 4 CM
BH CV ECHO MEAS - LAT PEAK E' VEL: 15 CM/SEC
BH CV ECHO MEAS - LV DIASTOLIC VOL/BSA (35-75): 50.3 CM2
BH CV ECHO MEAS - LV MASS(C)D: 93 GRAMS
BH CV ECHO MEAS - LV MAX PG: 6.6 MMHG
BH CV ECHO MEAS - LV MEAN PG: 4 MMHG
BH CV ECHO MEAS - LV SYSTOLIC VOL/BSA (12-30): 19.3 CM2
BH CV ECHO MEAS - LV V1 MAX: 128 CM/SEC
BH CV ECHO MEAS - LV V1 VTI: 27.9 CM
BH CV ECHO MEAS - LVIDD: 4.2 CM
BH CV ECHO MEAS - LVIDS: 2.9 CM
BH CV ECHO MEAS - LVOT AREA: 2.8 CM2
BH CV ECHO MEAS - LVOT DIAM: 1.9 CM
BH CV ECHO MEAS - LVPWD: 0.7 CM
BH CV ECHO MEAS - MED PEAK E' VEL: 10.4 CM/SEC
BH CV ECHO MEAS - MV A MAX VEL: 85.2 CM/SEC
BH CV ECHO MEAS - MV DEC SLOPE: 688 CM/SEC2
BH CV ECHO MEAS - MV DEC TIME: 0.18 MSEC
BH CV ECHO MEAS - MV E MAX VEL: 121 CM/SEC
BH CV ECHO MEAS - MV E/A: 1.42
BH CV ECHO MEAS - PA ACC TIME: 0.14 SEC
BH CV ECHO MEAS - PA PR(ACCEL): 15.5 MMHG
BH CV ECHO MEAS - RVSP: 21 MMHG
BH CV ECHO MEAS - SI(MOD-SP2): 23.2 ML/M2
BH CV ECHO MEAS - SI(MOD-SP4): 30.9 ML/M2
BH CV ECHO MEAS - SV(LVOT): 79.1 ML
BH CV ECHO MEAS - SV(MOD-SP2): 40 ML
BH CV ECHO MEAS - SV(MOD-SP4): 53.4 ML
BH CV ECHO MEAS - TAPSE (>1.6): 1.78 CM
BH CV ECHO MEAS - TR MAX PG: 18.4 MMHG
BH CV ECHO MEAS - TR MAX VEL: 214.5 CM/SEC
BH CV ECHO MEASUREMENTS AVERAGE E/E' RATIO: 9.53
BH CV VAS BP LEFT ARM: NORMAL MMHG
BH CV XLRA - RV BASE: 3.2 CM
BH CV XLRA - RV LENGTH: 7.5 CM
BH CV XLRA - RV MID: 2.3 CM
BH CV XLRA - TDI S': 14.6 CM/SEC
LEFT ATRIUM VOLUME INDEX: 19.5 ML/M2
LV EF 2D ECHO EST: 60 %
MAXIMAL PREDICTED HEART RATE: 177 BPM
STRESS TARGET HR: 150 BPM

## 2023-03-07 ENCOUNTER — TELEPHONE (OUTPATIENT)
Dept: CARDIOLOGY | Facility: CLINIC | Age: 43
End: 2023-03-07
Payer: COMMERCIAL

## 2023-03-07 NOTE — TELEPHONE ENCOUNTER
Called patient to let her know that her echocardiogram was normal.     ----- Message from Chance Kelly MD sent at 3/3/2023  2:13 PM EST -----  Please call the patient tell her the echo was normal.      ----- Message -----  From: Joseph Flower MD  Sent: 3/2/2023   6:05 AM EST  To: Chance Kelly MD

## 2023-04-07 ENCOUNTER — OFFICE VISIT (OUTPATIENT)
Dept: ENDOCRINOLOGY | Facility: CLINIC | Age: 43
End: 2023-04-07
Payer: COMMERCIAL

## 2023-04-07 VITALS
OXYGEN SATURATION: 97 % | WEIGHT: 139 LBS | SYSTOLIC BLOOD PRESSURE: 114 MMHG | BODY MASS INDEX: 21.82 KG/M2 | DIASTOLIC BLOOD PRESSURE: 78 MMHG | HEART RATE: 71 BPM | HEIGHT: 67 IN

## 2023-04-07 DIAGNOSIS — E11.65 UNCONTROLLED TYPE 2 DIABETES MELLITUS WITH HYPERGLYCEMIA: Primary | ICD-10-CM

## 2023-04-07 DIAGNOSIS — I11.9 HYPERTENSIVE HEART DISEASE WITHOUT HEART FAILURE: ICD-10-CM

## 2023-04-07 DIAGNOSIS — E78.5 DYSLIPIDEMIA: ICD-10-CM

## 2023-04-07 DIAGNOSIS — E11.49 TYPE 2 DIABETES MELLITUS WITH NEUROLOGICAL MANIFESTATIONS: ICD-10-CM

## 2023-04-07 LAB
ALBUMIN SERPL-MCNC: 4.6 G/DL (ref 3.5–5.2)
ALBUMIN UR-MCNC: <1.2 MG/DL
ALBUMIN/GLOB SERPL: 1.2 G/DL
ALP SERPL-CCNC: 122 U/L (ref 39–117)
ALT SERPL W P-5'-P-CCNC: 24 U/L (ref 1–33)
ANION GAP SERPL CALCULATED.3IONS-SCNC: 11 MMOL/L (ref 5–15)
AST SERPL-CCNC: 25 U/L (ref 1–32)
BILIRUB SERPL-MCNC: 0.4 MG/DL (ref 0–1.2)
BUN SERPL-MCNC: 11 MG/DL (ref 6–20)
BUN/CREAT SERPL: 15.3 (ref 7–25)
CALCIUM SPEC-SCNC: 9.2 MG/DL (ref 8.6–10.5)
CHLORIDE SERPL-SCNC: 104 MMOL/L (ref 98–107)
CHOLEST SERPL-MCNC: 260 MG/DL (ref 0–200)
CO2 SERPL-SCNC: 23 MMOL/L (ref 22–29)
CREAT SERPL-MCNC: 0.72 MG/DL (ref 0.57–1)
CREAT UR-MCNC: 183.7 MG/DL
EGFRCR SERPLBLD CKD-EPI 2021: 105.9 ML/MIN/1.73
EXPIRATION DATE: NORMAL
GLOBULIN UR ELPH-MCNC: 3.8 GM/DL
GLUCOSE BLDC GLUCOMTR-MCNC: 97 MG/DL (ref 70–130)
GLUCOSE SERPL-MCNC: 109 MG/DL (ref 65–99)
HDLC SERPL-MCNC: 45 MG/DL (ref 40–60)
LDLC SERPL CALC-MCNC: 182 MG/DL (ref 0–100)
LDLC/HDLC SERPL: 3.99 {RATIO}
Lab: NORMAL
MICROALBUMIN/CREAT UR: NORMAL MG/G{CREAT}
POTASSIUM SERPL-SCNC: 4.1 MMOL/L (ref 3.5–5.2)
PROT SERPL-MCNC: 8.4 G/DL (ref 6–8.5)
SODIUM SERPL-SCNC: 138 MMOL/L (ref 136–145)
TRIGL SERPL-MCNC: 178 MG/DL (ref 0–150)
TSH SERPL DL<=0.05 MIU/L-ACNC: 1.39 UIU/ML (ref 0.27–4.2)
VLDLC SERPL-MCNC: 33 MG/DL (ref 5–40)

## 2023-04-07 PROCEDURE — 99214 OFFICE O/P EST MOD 30 MIN: CPT | Performed by: INTERNAL MEDICINE

## 2023-04-07 PROCEDURE — 80061 LIPID PANEL: CPT | Performed by: INTERNAL MEDICINE

## 2023-04-07 PROCEDURE — 80053 COMPREHEN METABOLIC PANEL: CPT | Performed by: INTERNAL MEDICINE

## 2023-04-07 PROCEDURE — 3074F SYST BP LT 130 MM HG: CPT | Performed by: INTERNAL MEDICINE

## 2023-04-07 PROCEDURE — 1160F RVW MEDS BY RX/DR IN RCRD: CPT | Performed by: INTERNAL MEDICINE

## 2023-04-07 PROCEDURE — 82947 ASSAY GLUCOSE BLOOD QUANT: CPT | Performed by: INTERNAL MEDICINE

## 2023-04-07 PROCEDURE — 82570 ASSAY OF URINE CREATININE: CPT | Performed by: INTERNAL MEDICINE

## 2023-04-07 PROCEDURE — 82043 UR ALBUMIN QUANTITATIVE: CPT | Performed by: INTERNAL MEDICINE

## 2023-04-07 PROCEDURE — 3078F DIAST BP <80 MM HG: CPT | Performed by: INTERNAL MEDICINE

## 2023-04-07 PROCEDURE — 1159F MED LIST DOCD IN RCRD: CPT | Performed by: INTERNAL MEDICINE

## 2023-04-07 PROCEDURE — 84443 ASSAY THYROID STIM HORMONE: CPT | Performed by: INTERNAL MEDICINE

## 2023-04-07 RX ORDER — FERROUS SULFATE 325(65) MG
1 TABLET ORAL DAILY
COMMUNITY
Start: 2023-03-10

## 2023-04-07 NOTE — PROGRESS NOTES
"     Office Note      Date: 2023  Patient Name: Molly Joshua  MRN: [id number]  : 1980    Chief Complaint   Patient presents with   • Diabetes       History of Present Illness:   Molly Joshua is a 43 y.o. female who presents for Diabetes type 2. Diagnosed in: 2019. Treated in past with oral agents.  She had trouble tolerating metformin.  Current treatments: ozempic. Number of insulin shots per day: none. Checks blood sugar PRN. Has low blood sugar: no. Aspirin use: No - stopped by cardiologist.. Statin use: Yes - on ezetimibe. ACE-I/ARB use: Yes.  Change in health since last visit: none. Last eye exam: 2023.    Subjective      Diabetic Complications:  Eyes: No  Kidneys: No  Feet: No  Heart: No    Diet and Exercise:  Meals per day: 3  Minutes of exercise per week: 0 mins.    Review of Systems:   Review of Systems   Constitutional: Positive for fatigue.   Cardiovascular: Negative.    Gastrointestinal: Positive for nausea.   Endocrine: Negative.        The following portions of the patient's history were reviewed and updated as appropriate: allergies, current medications, past family history, past medical history, past social history, past surgical history and problem list.    Objective       Visit Vitals  /78   Pulse 71   Ht 170.2 cm (67\")   Wt 63 kg (139 lb)   SpO2 97%   BMI 21.77 kg/m²       Physical Exam:  Physical Exam  Constitutional:       Appearance: Normal appearance.   Cardiovascular:      Pulses:           Dorsalis pedis pulses are 2+ on the right side and 2+ on the left side.        Posterior tibial pulses are 2+ on the right side and 2+ on the left side.   Feet:      Right foot:      Protective Sensation: 5 sites tested. 5 sites sensed.      Skin integrity: Dry skin present.      Toenail Condition: Right toenails are normal.      Left foot:      Protective Sensation: 5 sites tested. 5 sites sensed.      Skin integrity: Dry skin present.      Toenail Condition: Left " toenails are normal.   Neurological:      Mental Status: She is alert.         Labs:    HbA1c  Lab Results   Component Value Date    HGBA1C 6.3 (H) 02/13/2023       CMP  Lab Results   Component Value Date    GLUCOSE 88 01/13/2022    BUN 14 01/13/2022    CREATININE 0.65 01/13/2022    EGFRIFNONA 100 01/13/2022    EGFRIFAFRI 121 01/13/2022    BCR 21.5 01/13/2022    K 4.3 01/13/2022    CO2 22.6 01/13/2022    CALCIUM 9.9 01/13/2022    AST 22 01/13/2022    ALT 30 01/13/2022        Lipid Panel        TSH  Lab Results   Component Value Date    TSH 1.570 01/13/2022        Hemoglobin A1C  Lab Results   Component Value Date    HGBA1C 6.3 (H) 02/13/2023        Microalbumin/Creatinine  Lab Results   Component Value Date    MALBCRERATIO  01/13/2022      Comment:      Unable to calculate    MICROALBUR <1.2 01/13/2022           Assessment / Plan      Assessment & Plan:  Diagnoses and all orders for this visit:    1. Uncontrolled type 2 diabetes mellitus with hyperglycemia (Primary)  Assessment & Plan:  Diabetes is worsening.  Recent A1c increased to 6.3% but still quite good.  Try to increase from 0.25 to 0.5mg dose of ozempic if nausea will allow.  Her GI doctor wants her to lose more weight due to fatty liver.  Diabetes will be reassessed in 3 months.    Orders:  -     POC Glucose, Blood  -     Comprehensive Metabolic Panel; Future  -     Microalbumin / Creatinine Urine Ratio - Urine, Clean Catch; Future  -     TSH; Future    2. Type 2 diabetes mellitus with neurological manifestations    3. Hypertensive heart disease without heart failure  Assessment & Plan:  BP okay.  Continue ACE-I.      4. Dyslipidemia  Assessment & Plan:  Continue statin and ezetimibe.    Orders:  -     Lipid Panel; Future    Current Outpatient Medications   Medication Instructions   • cetirizine (ZYRTEC) 10 mg, Oral, Daily   • Cholecalciferol (Vitamin D3) 1.25 MG (23039 UT) capsule No dose, route, or frequency recorded.   • escitalopram (LEXAPRO) 20 mg,  Daily   • ezetimibe (ZETIA) 10 mg, Oral, Daily   • FeroSul 325 (65 Fe) MG tablet 1 tablet, Oral, Daily   • flecainide (TAMBOCOR) 100 mg, Oral, 2 Times Daily   • lisinopril (PRINIVIL,ZESTRIL) 5 mg, Oral, Daily   • omeprazole (PRILOSEC) 40 mg, Oral, Daily   • Ozempic (0.25 or 0.5 MG/DOSE) 0.5 mg, Subcutaneous, Weekly   • rosuvastatin (CRESTOR) 40 mg, Oral, Daily      Return in about 3 months (around 7/7/2023) for Recheck with A1c.    Jorge Eastman MD   04/07/2023

## 2023-04-07 NOTE — ASSESSMENT & PLAN NOTE
Diabetes is worsening.  Recent A1c increased to 6.3% but still quite good.  Try to increase from 0.25 to 0.5mg dose of ozempic if nausea will allow.  Her GI doctor wants her to lose more weight due to fatty liver.  Diabetes will be reassessed in 3 months.

## 2023-08-08 ENCOUNTER — OFFICE VISIT (OUTPATIENT)
Dept: CARDIOLOGY | Facility: CLINIC | Age: 43
End: 2023-08-08
Payer: COMMERCIAL

## 2023-08-08 VITALS
HEIGHT: 67 IN | OXYGEN SATURATION: 99 % | HEART RATE: 91 BPM | DIASTOLIC BLOOD PRESSURE: 68 MMHG | BODY MASS INDEX: 21.82 KG/M2 | WEIGHT: 139 LBS | SYSTOLIC BLOOD PRESSURE: 118 MMHG

## 2023-08-08 DIAGNOSIS — I49.3 PVC (PREMATURE VENTRICULAR CONTRACTION): Primary | ICD-10-CM

## 2023-08-08 DIAGNOSIS — I11.0 HYPERTENSIVE HEART DISEASE WITH DIASTOLIC CONGESTIVE HEART FAILURE, UNSPECIFIED HF CHRONICITY: ICD-10-CM

## 2023-08-08 DIAGNOSIS — I50.30 HYPERTENSIVE HEART DISEASE WITH DIASTOLIC CONGESTIVE HEART FAILURE, UNSPECIFIED HF CHRONICITY: ICD-10-CM

## 2023-08-08 PROCEDURE — 93000 ELECTROCARDIOGRAM COMPLETE: CPT | Performed by: PHYSICIAN ASSISTANT

## 2023-08-08 PROCEDURE — 3074F SYST BP LT 130 MM HG: CPT | Performed by: PHYSICIAN ASSISTANT

## 2023-08-08 PROCEDURE — 3078F DIAST BP <80 MM HG: CPT | Performed by: PHYSICIAN ASSISTANT

## 2023-08-08 PROCEDURE — 99214 OFFICE O/P EST MOD 30 MIN: CPT | Performed by: PHYSICIAN ASSISTANT

## 2023-08-08 NOTE — PROGRESS NOTES
"Molly Mallory Josiane  1980  [phone]    [facility]     Bashir Das MD  [address] [place] [address]    Chief Complaint   Patient presents with    PVC       Problem List:      Premature ventricular contractions: .   [facility], with 1-2 week history of dizziness and near syncope, noted to have runs of wide QRS complex tachycardia felt to be left ventricular outflow tract VT.   Treated with Flecainide.   Echocardiogram during her pregnancy in 2001 or 2002 was reportedly unremarkable.   Echocardiogram at [facility], 10/18/2013, showed normal left ventricular size and function. Ejection fraction 55% to 60%. No significant valvular abnormalities.   Echocardiogram, 02/22/2016, showed normal LV systolic function with ejection fraction greater than 65%. Trace mitral and trace tricuspid regurgitation.  A 24-hour Holter 01/29/2016 showed sinus rhythm with very frequent PVCs (12% of QRS complexes).  Treadmill stress test, 02/22/2016: Remarkable for average exercise capacity, normal hemodynamic response. Negative test for angina or ischemic ST segment changes. No exercise-induced arrhythmias although frequent PVCs were noted at rest which improved with exercise.  Ep study with identification of 2 PVCs (posterior Left ventricular wall blow mitral valve annulus- associated with NSVT, successfully ablated) second PVC felt to be deep seated and would potentially require epicardial ablation. 6/2/16  24 hour Holter monitor 9/6/2019: Average HR 68 bpm ( bpm), rare PVC's  Echocardiogram 10/11/19: EF 60%, mild MR/TR  Echocardiogram  EF 60% Mitral MR.   Anxiety and panic disorder.   Chronic back pain.   \"Hypertensive heart disease\" as noted in her records. No documentation available.   Family history of coronary artery disease, father has had stents.   Dyslipidemia.   7.   DM2     Allergies  Allergies   Allergen " Reactions    Black [place] Pollen Hives    Milk-Related Compounds Hives    Wheat Hives    Iodopropynyl Butylcarbamate [Iodopropynyl] Itching    Metformin Diarrhea and GI Intolerance       Current Medications    Current Outpatient Medications:     cetirizine (zyrTEC) 10 MG tablet, Take 1 tablet by mouth Daily., Disp: , Rfl:     escitalopram (LEXAPRO) 20 MG tablet, 1 tablet Daily., Disp: , Rfl:     FeroSul 325 (65 Fe) MG tablet, Take 1 tablet by mouth Daily., Disp: , Rfl:     flecainide (TAMBOCOR) 100 MG tablet, Take 1 tablet by mouth 2 (Two) Times a Day., Disp: 180 tablet, Rfl: 3    lisinopril (PRINIVIL,ZESTRIL) 5 MG tablet, Take 1 tablet by mouth Daily., Disp: 30 tablet, Rfl: 11    omeprazole (priLOSEC) 40 MG capsule, Take 1 capsule by mouth Daily., Disp: , Rfl:     rosuvastatin (CRESTOR) 40 MG tablet, Take 1 tablet by mouth Daily., Disp: , Rfl:     Semaglutide,0.25 or 0.5MG/DOS, (Ozempic, 0.25 or 0.5 MG/DOSE,) 2 MG/1.5ML solution pen-injector, Inject 0.5 mg under the skin into the appropriate area as directed 1 (One) Time Per Week., Disp: 4.5 mL, Rfl: 3    Cholecalciferol (Vitamin D3) 1.25 MG (04313 UT) capsule, , Disp: , Rfl:     ezetimibe (ZETIA) 10 MG tablet, Take 1 tablet by mouth Daily., Disp: , Rfl:     History of Present Illness     Pt presents for follow up of PVC/HTN.  Since he was last seen the patient and she reports that she stopped all her medicines a couple months ago.  She felt she did not need them.  She has lost some weight with recent treatment with her GLP 2.  She says her blood pressure well-controlled.  She occasionally feels palpitation but this is not overly bothersome to her.  She denies any chest pain chest tightness angina or heart failure symptoms.    ROS:  General:  Denies fatigue, weight gain or loss  Cardiovascular:  Denies CP, PND, syncope, near syncope, edema or palpitations.  Pulmonary:  Denies HOLLAND, cough, or wheezing      Vitals:    08/08/23 1355   BP: 118/68   BP Location: Right  "arm   Patient Position: Sitting   Pulse: 91   SpO2: 99%   Weight: 63 kg (139 lb)   Height: 170.2 cm (67.01\")       Body mass index is 21.77 kg/mý.  PE:  General: NAD  Neck: no JVD, no carotid bruits, no TM  Heart RRR, with ectopic beats NL S1, S2, S4 present, no rubs, + murmur  Lungs: CTA, no wheezes, rhonchi, or rales  Abd: soft, non-tender, NL BS  Ext: No musculoskeletal deformities, no edema, cyanosis, or clubbing  Psych: normal mood and affect    Diagnostic Data:        ECG 12 Lead    Date/Time: 8/8/2023 4:18 PM  Performed by: Amari Batres PA  Authorized by: Amari Batres PA   Comparison: compared with previous ECG from 2/8/2023  Similar to previous ECG  Rhythm: sinus rhythm  Ectopy: unifocal PVCs  Rate: normal  Conduction: conduction normal  ST Segments: ST segments normal  T Waves: T waves normal  QRS axis: normal    Clinical impression: non-specific ECG          1. PVC (premature ventricular contraction)    2. Hypertensive heart disease with diastolic congestive heart failure, unspecified HF chronicity          Plan:    1) PVC's:  - Flecainide Rx. EKG stable, Monitor for toxicity. .  Patient states she is not compliant she stopped it over a month ago.  She will reinitiate flecainide tomorrow Wednesday and with a EKG 48 hours later on a Friday.  - Continue present medications     2) HTN:  - Well controlled, patient stopped her lisinopril to be  - Wt loss, exercise, salt reduction    3) Cardiac Murmur:  -Echocardiogram-Mild MR,Mild TR. normal EF 3/1/2023     4) medical noncompliance      Patient noncompliant with all medications which she stopped about 2 months ago.  She is interested in  starting flecainide therapy as she does note that she has had some irregular heartbeats.  She will start this tomorrow morning with 48 hours later have an EKG to monitor for toxicity.  Her EKG today reveals bigeminy with frequent unifocal PVCs.  F/up in 6 months    Electronically signed by GREG Reed, " 08/08/23, 2:33 PM EDT.

## 2023-08-11 ENCOUNTER — CLINICAL SUPPORT (OUTPATIENT)
Dept: CARDIOLOGY | Facility: CLINIC | Age: 43
End: 2023-08-11
Payer: COMMERCIAL

## 2023-08-11 DIAGNOSIS — I49.3 PVC (PREMATURE VENTRICULAR CONTRACTION): Primary | ICD-10-CM

## 2023-08-14 ENCOUNTER — OFFICE VISIT (OUTPATIENT)
Dept: ENDOCRINOLOGY | Facility: CLINIC | Age: 43
End: 2023-08-14
Payer: COMMERCIAL

## 2023-08-14 VITALS
WEIGHT: 138 LBS | BODY MASS INDEX: 21.66 KG/M2 | DIASTOLIC BLOOD PRESSURE: 73 MMHG | HEIGHT: 67 IN | HEART RATE: 71 BPM | SYSTOLIC BLOOD PRESSURE: 117 MMHG | OXYGEN SATURATION: 97 %

## 2023-08-14 DIAGNOSIS — E78.5 DYSLIPIDEMIA: ICD-10-CM

## 2023-08-14 DIAGNOSIS — I10 BENIGN HYPERTENSION: ICD-10-CM

## 2023-08-14 DIAGNOSIS — E11.49 TYPE 2 DIABETES MELLITUS WITH NEUROLOGICAL MANIFESTATIONS: ICD-10-CM

## 2023-08-14 DIAGNOSIS — E11.65 UNCONTROLLED TYPE 2 DIABETES MELLITUS WITH HYPERGLYCEMIA: Primary | ICD-10-CM

## 2023-08-14 LAB
EXPIRATION DATE: ABNORMAL
EXPIRATION DATE: NORMAL
GLUCOSE BLDC GLUCOMTR-MCNC: 174 MG/DL (ref 70–130)
HBA1C MFR BLD: 6.6 %
Lab: ABNORMAL
Lab: NORMAL

## 2023-08-14 PROCEDURE — 1160F RVW MEDS BY RX/DR IN RCRD: CPT | Performed by: INTERNAL MEDICINE

## 2023-08-14 PROCEDURE — 82947 ASSAY GLUCOSE BLOOD QUANT: CPT | Performed by: INTERNAL MEDICINE

## 2023-08-14 PROCEDURE — 3044F HG A1C LEVEL LT 7.0%: CPT | Performed by: INTERNAL MEDICINE

## 2023-08-14 PROCEDURE — 1159F MED LIST DOCD IN RCRD: CPT | Performed by: INTERNAL MEDICINE

## 2023-08-14 PROCEDURE — 83036 HEMOGLOBIN GLYCOSYLATED A1C: CPT | Performed by: INTERNAL MEDICINE

## 2023-08-14 PROCEDURE — 99214 OFFICE O/P EST MOD 30 MIN: CPT | Performed by: INTERNAL MEDICINE

## 2023-08-14 PROCEDURE — 3078F DIAST BP <80 MM HG: CPT | Performed by: INTERNAL MEDICINE

## 2023-08-14 PROCEDURE — 3074F SYST BP LT 130 MM HG: CPT | Performed by: INTERNAL MEDICINE

## 2023-08-14 RX ORDER — DAPAGLIFLOZIN 5 MG/1
5 TABLET, FILM COATED ORAL DAILY
Qty: 30 TABLET | Refills: 5 | Status: SHIPPED | OUTPATIENT
Start: 2023-08-14

## 2023-08-14 NOTE — ASSESSMENT & PLAN NOTE
Diabetes is unchanged.  A1c has increased some but is okay.  However, her hepatologist would prefer she lose some more weight due to fatty liver.  Continue current treatment regimen.  But try adding SGLT-2 inhibitor.  Potential s/e discussed.  Diabetes will be reassessed in 3 months.

## 2023-08-14 NOTE — PROGRESS NOTES
"     Office Note      Date: 2023  Patient Name: Molly Joshua  MRN: [id number]  : 1980    Chief Complaint   Patient presents with    Diabetes       History of Present Illness:   Molly Joshua is a 43 y.o. female who presents for Diabetes type 2. Diagnosed in: 2019. Treated in past with oral agents.  She had trouble tolerating metformin.  Current treatments: ozempic. Number of insulin shots per day: none. Checks blood sugar PRN. Has low blood sugar: no. Aspirin use: No - stopped by cardiologist.. Statin use: Yes and on ezetimibe. ACE-I/ARB use: Yes.  Change in health since last visit: none. Last eye exam: 2023.     Subjective      Diabetic Complications:  Eyes: No  Kidneys: No  Feet: No  Heart: No    Diet and Exercise:  Meals per day: 3  Minutes of exercise per week: 0 mins.    Review of Systems:   Review of Systems   Constitutional:  Positive for fatigue.   Cardiovascular: Negative.    Gastrointestinal:  Positive for nausea.   Endocrine: Negative.      The following portions of the patient's history were reviewed and updated as appropriate: allergies, current medications, past family history, past medical history, past social history, past surgical history, and problem list.    Objective     Visit Vitals  /73   Pulse 71   Ht 170.2 cm (67\")   Wt 62.6 kg (138 lb)   SpO2 97%   BMI 21.61 kg/mý       Physical Exam:  Physical Exam  Constitutional:       Appearance: Normal appearance.   Neurological:      Mental Status: She is alert.       Labs:    HbA1c  Lab Results   Component Value Date    HGBA1C 6.6 2023       CMP  Lab Results   Component Value Date    GLUCOSE 109 (H) 2023    BUN 11 2023    CREATININE 0.72 2023    EGFRIFNONA 100 2022    EGFRIFAFRI 121 2022    BCR 15.3 2023    K 4.1 2023    CO2 23.0 2023    CALCIUM 9.2 2023    AST 25 2023    ALT 24 2023        Lipid Panel  Lab Results   Component Value Date    " HDL 45 04/07/2023     (H) 04/07/2023    TRIG 178 (H) 04/07/2023        TSH  Lab Results   Component Value Date    TSH 1.390 04/07/2023        Hemoglobin A1C  Lab Results   Component Value Date    HGBA1C 6.6 08/14/2023        Microalbumin/Creatinine  Lab Results   Component Value Date    FRANKO  04/07/2023      Comment:      Unable to calculate    MICROALBUR <1.2 04/07/2023           Assessment / Plan      Assessment & Plan:  Diagnoses and all orders for this visit:    1. Uncontrolled type 2 diabetes mellitus with hyperglycemia (Primary)  Assessment & Plan:  Diabetes is unchanged.  A1c has increased some but is okay.  However, her hepatologist would prefer she lose some more weight due to fatty liver.  Continue current treatment regimen.  But try adding SGLT-2 inhibitor.  Potential s/e discussed.  Diabetes will be reassessed in 3 months.    Orders:  -     POC Glucose, Blood  -     POC Glycosylated Hemoglobin (Hb A1C)    2. Type 2 diabetes mellitus with neurological manifestations    3. Benign hypertension  Assessment & Plan:  Hypertension is unchanged.  Continue current treatment regimen.  Blood pressure will be reassessed at the next regular appointment.      4. Dyslipidemia  Assessment & Plan:  Continue statin.      Other orders  -     dapagliflozin (Farxiga) 5 MG tablet tablet; Take 1 tablet by mouth Daily.  Dispense: 30 tablet; Refill: 5      Current Outpatient Medications   Medication Instructions    cetirizine (ZYRTEC) 10 mg, Oral, Daily    Cholecalciferol (Vitamin D3) 1.25 MG (77936 UT) capsule No dose, route, or frequency recorded.    escitalopram (LEXAPRO) 20 mg, Daily    ezetimibe (ZETIA) 10 mg, Oral, Daily    Farxiga 5 mg, Oral, Daily    FeroSul 325 (65 Fe) MG tablet 1 tablet, Oral, Daily    flecainide (TAMBOCOR) 100 mg, Oral, 2 Times Daily    lisinopril (PRINIVIL,ZESTRIL) 5 mg, Oral, Daily    omeprazole (PRILOSEC) 40 mg, Oral, Daily    Ozempic (0.25 or 0.5 MG/DOSE) 0.5 mg, Subcutaneous,  Weekly    rosuvastatin (CRESTOR) 40 mg, Oral, Daily      Return in about 3 months (around 11/14/2023) for Recheck with A1c, CMP.    Jorge Eastman MD   08/14/2023

## 2023-08-15 ENCOUNTER — PRIOR AUTHORIZATION (OUTPATIENT)
Dept: ENDOCRINOLOGY | Facility: CLINIC | Age: 43
End: 2023-08-15
Payer: COMMERCIAL

## 2023-08-15 NOTE — TELEPHONE ENCOUNTER
Molly Josiane Key: [id number] - PA Case ID: [id number] - Rx #: [id number] help? Call us at [phone]  Outcome  Approvedtoday  The request has been approved. The authorization is effective from 08/15/2023 to 08/14/2024, as long as the member is enrolled in their current health plan. The request was approved as submitted. A written notification letter will follow with additional details.  Drug  Farxiga 5MG tablets  Form  [facility] [place] Medicaid ePA Form 2017 NCPDP

## 2023-11-15 RX ORDER — SEMAGLUTIDE 0.68 MG/ML
0.5 INJECTION, SOLUTION SUBCUTANEOUS WEEKLY
Qty: 9 ML | Refills: 3 | Status: SHIPPED | OUTPATIENT
Start: 2023-11-15 | End: 2023-11-20 | Stop reason: SDUPTHER

## 2023-11-15 RX ORDER — SEMAGLUTIDE 1.34 MG/ML
0.5 INJECTION, SOLUTION SUBCUTANEOUS WEEKLY
Qty: 4.5 ML | Refills: 3 | Status: CANCELLED | OUTPATIENT
Start: 2023-11-15

## 2023-11-15 RX ORDER — DAPAGLIFLOZIN 5 MG/1
5 TABLET, FILM COATED ORAL DAILY
Qty: 30 TABLET | Refills: 5 | Status: SHIPPED | OUTPATIENT
Start: 2023-11-15 | End: 2023-11-20 | Stop reason: SDUPTHER

## 2023-11-15 NOTE — TELEPHONE ENCOUNTER
Rx Refill Note  Requested Prescriptions     Pending Prescriptions Disp Refills    Semaglutide,0.25 or 0.5MG/DOS, (Ozempic, 0.25 or 0.5 MG/DOSE,) 2 MG/3ML solution pen-injector 3 mL 2     Sig: Inject 0.5 mg under the skin into the appropriate area as directed 1 (One) Time Per Week.        Last office visit with prescribing clinician: 8/14/2023      Next office visit with prescribing clinician: 1/9/2024       Thuy Porter (Jodi)  11/15/23, 15:40 EST

## 2023-11-15 NOTE — TELEPHONE ENCOUNTER
Rx Refill Note  Requested Prescriptions     Pending Prescriptions Disp Refills    dapagliflozin (Farxiga) 5 MG tablet tablet 30 tablet 5     Sig: Take 1 tablet by mouth Daily.        Last office visit with prescribing clinician: 8/14/2023      Next office visit with prescribing clinician: 1/9/24      Thuy Porter (Jodi)  11/15/23, 15:38 EST

## 2023-11-16 ENCOUNTER — PRIOR AUTHORIZATION (OUTPATIENT)
Dept: ENDOCRINOLOGY | Facility: CLINIC | Age: 43
End: 2023-11-16
Payer: COMMERCIAL

## 2023-11-16 NOTE — TELEPHONE ENCOUNTER
Molly Joshua (Davies: [id number])  Rx #: [id number]  Ozempic (0.25 or 0.5 MG/DOSE) 2MG/3ML pen-injectors     Form  [employer] [place] Medicaid ePA Form 2017 NCPDP  Created  16 hours ago  Sent to Plan  17 minutes ago  Plan Response  16 minutes ago  Submit Clinical Questions  7 minutes ago  Determination  Favorable  7 minutes ago  Message from Plan  The request has been approved. The authorization is effective from 11/16/2023 to 11/15/2024

## 2023-11-20 RX ORDER — SEMAGLUTIDE 0.68 MG/ML
0.5 INJECTION, SOLUTION SUBCUTANEOUS WEEKLY
Qty: 9 ML | Refills: 3 | Status: SHIPPED | OUTPATIENT
Start: 2023-11-20

## 2023-11-20 RX ORDER — ROSUVASTATIN CALCIUM 40 MG/1
40 TABLET, COATED ORAL DAILY
Qty: 90 TABLET | Refills: 3 | Status: SHIPPED | OUTPATIENT
Start: 2023-11-20

## 2023-11-20 RX ORDER — DAPAGLIFLOZIN 5 MG/1
5 TABLET, FILM COATED ORAL DAILY
Qty: 90 TABLET | Refills: 3 | Status: SHIPPED | OUTPATIENT
Start: 2023-11-20

## 2023-11-20 NOTE — TELEPHONE ENCOUNTER
----- Message from Molly Joshua sent at 11/19/2023 12:58 PM EST -----  Regarding: Ozempic injection and farxiga 5 mg refill   Contact: [phone]  Sir I am out off medication please refill and send prescription to [facility] [place] [address]

## 2023-11-20 NOTE — TELEPHONE ENCOUNTER
Rx Refill Note  Requested Prescriptions      No prescriptions requested or ordered in this encounter        Last office visit with prescribing clinician: 8/14/2023      Next office visit with prescribing clinician: 1/9/2024       Thuy Porter (Jodi)  11/20/23, 09:45 EST

## 2024-01-09 ENCOUNTER — OFFICE VISIT (OUTPATIENT)
Dept: ENDOCRINOLOGY | Facility: CLINIC | Age: 44
End: 2024-01-09
Payer: COMMERCIAL

## 2024-01-09 VITALS
HEART RATE: 78 BPM | WEIGHT: 128 LBS | OXYGEN SATURATION: 99 % | DIASTOLIC BLOOD PRESSURE: 68 MMHG | BODY MASS INDEX: 23.55 KG/M2 | HEIGHT: 62 IN | SYSTOLIC BLOOD PRESSURE: 118 MMHG

## 2024-01-09 DIAGNOSIS — I10 BENIGN HYPERTENSION: ICD-10-CM

## 2024-01-09 DIAGNOSIS — E11.65 TYPE 2 DIABETES MELLITUS WITH HYPERGLYCEMIA, WITHOUT LONG-TERM CURRENT USE OF INSULIN: Primary | ICD-10-CM

## 2024-01-09 DIAGNOSIS — E78.5 DYSLIPIDEMIA: ICD-10-CM

## 2024-01-09 DIAGNOSIS — E11.49 TYPE 2 DIABETES MELLITUS WITH NEUROLOGICAL MANIFESTATIONS: ICD-10-CM

## 2024-01-09 LAB
EXPIRATION DATE: ABNORMAL
EXPIRATION DATE: NORMAL
GLUCOSE BLDC GLUCOMTR-MCNC: 91 MG/DL (ref 70–130)
HBA1C MFR BLD: 6 % (ref 4.5–5.7)
Lab: ABNORMAL
Lab: NORMAL

## 2024-01-09 PROCEDURE — 80053 COMPREHEN METABOLIC PANEL: CPT | Performed by: INTERNAL MEDICINE

## 2024-01-09 RX ORDER — LISINOPRIL 5 MG/1
10 TABLET ORAL DAILY
Start: 2024-01-09

## 2024-01-09 NOTE — PROGRESS NOTES
"     Office Note      Date: 2024  Patient Name: Molly Joshua  MRN: [id number]  : 1980    Chief Complaint   Patient presents with    Diabetes     Uncontrolled type 2 diabetes mellitus with hyperglycemia         History of Present Illness:   Molly Joshua is a 43 y.o. female who presents for Diabetes type 2. Diagnosed in: 2019. Treated in past with oral agents.  She had trouble tolerating metformin.  Current treatments: farxiga and ozempic. Number of insulin shots per day: none. Checks blood sugar PRN. Has low blood sugar: no. Aspirin use: No - stopped by cardiologist.. Statin use: Yes. ACE-I/ARB use: Yes.  Change in health since last visit: none. Last eye exam: 2023.     Subjective      Diabetic Complications:  Eyes: No  Kidneys: No  Feet: No  Heart: No    Diet and Exercise:  Meals per day: 3  Minutes of exercise per week: 0 mins.    Review of Systems:   Review of Systems   Constitutional:  Positive for fatigue.   Cardiovascular: Negative.    Gastrointestinal:  Positive for constipation.   Endocrine: Negative.        The following portions of the patient's history were reviewed and updated as appropriate: allergies, current medications, past family history, past medical history, past social history, past surgical history, and problem list.    Objective     Visit Vitals  /68 (BP Location: Right arm, Patient Position: Sitting, Cuff Size: Adult)   Pulse 78   Ht 157.5 cm (62\")   Wt 58.1 kg (128 lb)   SpO2 99%   BMI 23.41 kg/m²       Physical Exam:  Physical Exam  Constitutional:       Appearance: Normal appearance.   Neurological:      Mental Status: She is alert.         Labs:    HbA1c  Lab Results   Component Value Date    HGBA1C 6.0 (A) 2024       CMP  Lab Results   Component Value Date    GLUCOSE 109 (H) 2023    BUN 11 2023    CREATININE 0.72 2023    EGFRIFNONA 100 2022    EGFRIFAFRI 121 2022    BCR 15.3 2023    K 4.1 2023    CO2 " 23.0 04/07/2023    CALCIUM 9.2 04/07/2023    AST 25 04/07/2023    ALT 24 04/07/2023        Lipid Panel  Lab Results   Component Value Date    HDL 45 04/07/2023     (H) 04/07/2023    TRIG 178 (H) 04/07/2023        TSH  Lab Results   Component Value Date    TSH 1.390 04/07/2023        Hemoglobin A1C  Lab Results   Component Value Date    HGBA1C 6.0 (A) 01/09/2024        Microalbumin/Creatinine  Lab Results   Component Value Date    MALBCRERATIO  04/07/2023      Comment:      Unable to calculate    MICROALBUR <1.2 04/07/2023           Assessment / Plan      Assessment & Plan:  Diagnoses and all orders for this visit:    1. Type 2 diabetes mellitus with hyperglycemia, without long-term current use of insulin (Primary)  Assessment & Plan:  Diabetes is improving with treatment.   Continue current treatment regimen.  Diabetes will be reassessed in 3 months.    Orders:  -     POC Glycosylated Hemoglobin (Hb A1C)  -     POC Glucose, Blood  -     Comprehensive Metabolic Panel; Future    2. Benign hypertension  Assessment & Plan:  Hypertension is unchanged.  Continue current treatment regimen.  Blood pressure will be reassessed at the next regular appointment.      3. Dyslipidemia  Assessment & Plan:  Continue statin.  Plan to check lipids next visit.      4. Type 2 diabetes mellitus with neurological manifestations    Other orders  -     lisinopril (PRINIVIL,ZESTRIL) 5 MG tablet; Take 2 tablets by mouth Daily.      Current Outpatient Medications   Medication Instructions    cetirizine (ZYRTEC) 10 mg, Oral, Daily    escitalopram (LEXAPRO) 20 mg, Daily    Farxiga 5 mg, Oral, Daily    flecainide (TAMBOCOR) 100 mg, Oral, 2 Times Daily    lisinopril (PRINIVIL,ZESTRIL) 10 mg, Oral, Daily    omeprazole (PRILOSEC) 40 mg, Oral, Daily    Ozempic (0.25 or 0.5 MG/DOSE) 0.5 mg, Subcutaneous, Weekly    rosuvastatin (CRESTOR) 40 mg, Oral, Daily      Return in about 3 months (around 4/9/2024) for Recheck with A1c, CMP, lipid, TSH,  microalbumin, foot exam.    Jorge Eastman MD   01/09/2024

## 2024-01-10 LAB
ALBUMIN SERPL-MCNC: 4.6 G/DL (ref 3.5–5.2)
ALBUMIN/GLOB SERPL: 1.3 G/DL
ALP SERPL-CCNC: 129 U/L (ref 39–117)
ALT SERPL W P-5'-P-CCNC: 75 U/L (ref 1–33)
ANION GAP SERPL CALCULATED.3IONS-SCNC: 12 MMOL/L (ref 5–15)
AST SERPL-CCNC: 46 U/L (ref 1–32)
BILIRUB SERPL-MCNC: 0.4 MG/DL (ref 0–1.2)
BUN SERPL-MCNC: 10 MG/DL (ref 6–20)
BUN/CREAT SERPL: 13.3 (ref 7–25)
CALCIUM SPEC-SCNC: 10.1 MG/DL (ref 8.6–10.5)
CHLORIDE SERPL-SCNC: 101 MMOL/L (ref 98–107)
CO2 SERPL-SCNC: 23 MMOL/L (ref 22–29)
CREAT SERPL-MCNC: 0.75 MG/DL (ref 0.57–1)
EGFRCR SERPLBLD CKD-EPI 2021: 100.8 ML/MIN/1.73
GLOBULIN UR ELPH-MCNC: 3.5 GM/DL
GLUCOSE SERPL-MCNC: 76 MG/DL (ref 65–99)
POTASSIUM SERPL-SCNC: 4.4 MMOL/L (ref 3.5–5.2)
PROT SERPL-MCNC: 8.1 G/DL (ref 6–8.5)
SODIUM SERPL-SCNC: 136 MMOL/L (ref 136–145)

## 2024-02-02 ENCOUNTER — OFFICE VISIT (OUTPATIENT)
Dept: CARDIOLOGY | Facility: CLINIC | Age: 44
End: 2024-02-02
Payer: COMMERCIAL

## 2024-02-02 VITALS
OXYGEN SATURATION: 99 % | HEART RATE: 81 BPM | HEIGHT: 62 IN | SYSTOLIC BLOOD PRESSURE: 112 MMHG | DIASTOLIC BLOOD PRESSURE: 70 MMHG | BODY MASS INDEX: 23.45 KG/M2 | WEIGHT: 127.4 LBS

## 2024-02-02 DIAGNOSIS — I49.3 PVC (PREMATURE VENTRICULAR CONTRACTION): Primary | ICD-10-CM

## 2024-02-02 DIAGNOSIS — E78.5 DYSLIPIDEMIA: ICD-10-CM

## 2024-02-02 DIAGNOSIS — I10 ESSENTIAL HYPERTENSION: ICD-10-CM

## 2024-02-02 DIAGNOSIS — I49.3 VENTRICULAR PREMATURE DEPOLARIZATION: Primary | ICD-10-CM

## 2024-02-02 RX ORDER — ROPINIROLE 1 MG/1
1 TABLET, FILM COATED ORAL
COMMUNITY
Start: 2024-01-23

## 2024-02-02 RX ORDER — ONDANSETRON 4 MG/1
4 TABLET, FILM COATED ORAL EVERY 8 HOURS PRN
COMMUNITY

## 2024-02-02 RX ORDER — EZETIMIBE 10 MG/1
10 TABLET ORAL DAILY
COMMUNITY

## 2024-02-02 NOTE — PROGRESS NOTES
"[facility]iology    Encounter Date: 2024    Patient ID: Molly Joshua is a 43 y.o. female.  : 1980     PCP: Bashir Das MD       Chief Complaint: PVC (premature ventricular contraction)      PROBLEM LIST:  Premature ventricular contractions: .   Hospitalized at [facility], 10/17/2013, with 1-2 week history of dizziness and near syncope, noted to have runs of wide QRS complex tachycardia felt to be left ventricular outflow tract VT.   Treated with Flecainide.   Echo during her pregnancy in  or  was reportedly unremarkable.   Echo at [facility], 10/18/2013, showed normal left ventricular size and function. Ejection fraction 55% to 60%. No significant valvular abnormalities.   Echo, 2016, showed normal LV systolic function with ejection fraction greater than 65%. Trace mitral and trace tricuspid regurgitation.  24h Holter 2016 showed sinus rhythm with very frequent PVCs (12% of QRS complexes).  Treadmill stress test, 2016: Remarkable for average exercise capacity, normal hemodynamic response. Negative test for angina or ischemic ST segment changes. No exercise-induced arrhythmias although frequent PVCs were noted at rest which improved with exercise.  EP study With identification of 2 PVCs (posterior Left ventricular wall blow mitral valve annulus- associated with NSVT, successfully ablated) second PVC felt to be deep seated and would potentially require epicardial ablation. 2016  24h Holter 2019: Average HR 68 bpm ( bpm), rare PVC's  Echo 10/11/2019: EF 60%, mild MR/TR  Echo, 2023: EF 60%. Trace to mild MR. Trace TR with normal RVSP.   Anxiety and panic disorder.   Chronic back pain.   \"Hypertensive heart disease\" as noted in her records. No documentation available.   Family history of coronary artery disease, father has had stents.   Dyslipidemia.   DM2     History of Present " Illness  Patient presents today for a 1 year follow-up with a history of PVCs and cardiac risk factors. Since last visit, patient has been doing well overall from a cardiovascular standpoint. However since December she has been experiencing elevated blood pressures and heart rates. Patient states her blood pressure gets as high as 150 mmHg systolic with a heart rate of 110 bpm. Her PCP increased her lisinopril to 10 mg daily from 5 mg in an effort to control her blood pressure.  Additionally she was started on metoprolol however after the symptoms resolved she discontinued metoprolol and has continued lisinopril.  Blood pressure is better controlled now.  Patient states that at times she gets anxious and nervous and her heart speeds up.  She has even noted some nocturnal palpitations.    She has not been exercising other than participating in household chores.  Does not maintain good hydration however avoids excessive caffeine consumption.        Allergies   Allergen Reactions   • Black [place] Pollen Hives   • Milk-Related Compounds Hives   • Wheat Hives   • Iodopropynyl Butylcarbamate [Iodopropynyl] Itching   • Metformin Diarrhea and GI Intolerance         Current Outpatient Medications:   •  cetirizine (zyrTEC) 10 MG tablet, Take 1 tablet by mouth Daily., Disp: , Rfl:   •  dapagliflozin (Farxiga) 5 MG tablet tablet, Take 1 tablet by mouth Daily., Disp: 90 tablet, Rfl: 3  •  escitalopram (LEXAPRO) 20 MG tablet, 1 tablet Daily., Disp: , Rfl:   •  ezetimibe (ZETIA) 10 MG tablet, Take 1 tablet by mouth Daily., Disp: , Rfl:   •  flecainide (TAMBOCOR) 100 MG tablet, Take 1 tablet by mouth 2 (Two) Times a Day., Disp: 180 tablet, Rfl: 3  •  lisinopril (PRINIVIL,ZESTRIL) 5 MG tablet, Take 2 tablets by mouth Daily., Disp: , Rfl:   •  omeprazole (priLOSEC) 40 MG capsule, Take 1 capsule by mouth Daily., Disp: , Rfl:   •  ondansetron (ZOFRAN) 4 MG tablet, Take 1 tablet by mouth Every 8 (Eight) Hours As Needed for Nausea or  "Vomiting., Disp: , Rfl:   •  rOPINIRole (REQUIP) 1 MG tablet, Take 1 tablet by mouth., Disp: , Rfl:   •  rosuvastatin (CRESTOR) 40 MG tablet, Take 1 tablet by mouth Daily., Disp: 90 tablet, Rfl: 3  •  Semaglutide,0.25 or 0.5MG/DOS, (Ozempic, 0.25 or 0.5 MG/DOSE,) 2 MG/3ML solution pen-injector, Inject 0.5 mg under the skin into the appropriate area as directed 1 (One) Time Per Week., Disp: 9 mL, Rfl: 3    The following portions of the patient's history were reviewed and updated as appropriate: allergies, current medications, past family history, past medical history, past social history, past surgical history and problem list.    ROS  Review of Systems   14 point ROS negative except for that listed in the HPI.         Objective:     /70 (BP Location: Left arm, Patient Position: Sitting, Cuff Size: Adult)   Pulse 81   Ht 157.5 cm (62\")   Wt 57.8 kg (127 lb 6.4 oz)   SpO2 99%   BMI 23.30 kg/m²      Physical Exam  Constitutional: Patient appears well-developed and well-nourished.   HENT: HEENT exam unremarkable.   Neck: Neck supple. No JVD present. No carotid bruits.   Cardiovascular: Normal rate, regular rhythm and normal heart sounds. No murmur heard.   2+ symmetric pulses.   Pulmonary/Chest: Breath sounds normal. Does not exhibit tenderness.   Abdominal: Abdomen benign.   Musculoskeletal: Does not exhibit edema.   Neurological: Neurological exam unremarkable.   Vitals reviewed.    Data Review:   Lab Results   Component Value Date    GLUCOSE 76 01/09/2024    BUN 10 01/09/2024    CREATININE 0.75 01/09/2024    EGFR 100.8 01/09/2024    BCR 13.3 01/09/2024     01/09/2024    K 4.4 01/09/2024    CO2 23.0 01/09/2024    CALCIUM 10.1 01/09/2024    ALBUMIN 4.6 01/09/2024    AST 46 (H) 01/09/2024    ALT 75 (H) 01/09/2024     Lab Results   Component Value Date    CHOL 260 (H) 04/07/2023    TRIG 178 (H) 04/07/2023    HDL 45 04/07/2023     (H) 04/07/2023      Lab Results   Component Value Date    WBC 9.18 " 02/13/2023    RBC 5.08 02/13/2023    HGB 11.3 02/13/2023    HCT 37.9 02/13/2023    MCV 75 (L) 02/13/2023     (H) 02/13/2023     Lab Results   Component Value Date    TSH 1.390 04/07/2023     Lab Results   Component Value Date    HGBA1C 6.0 (A) 01/09/2024          ECG 12 Lead    Date/Time: 2/2/2024 2:19 PM  Performed by: Joseph Flower MD    Authorized by: Joseph Flower MD  Comparison: compared with previous ECG from 8/11/2023  Similar to previous ECG  Rhythm: sinus rhythm  BPM: 81    Clinical impression: normal ECG  Comments: Normal QT interval         Assessment:      Diagnosis Plan   1. PVC (premature ventricular contraction)/palpitations Reports recurrent symptoms, 48h Holter monitor ordered to assess cardiac events. Continue on flecainide 100 mg BID for rhythm control.  Avoid caffeinated beverages, maintain good hydration.   2. Essential hypertension  Controlled. Continue on lisinopril 10 mg daily for hypertension.    3. Dyslipidemia  Well controlled. Continue on rosuvastatin 40 mg daily for hyperlipidemia.         Plan:   Patient reports recurrent palpitations and fluctuation of blood pressure.  48h Holter monitor ordered to assess cardiac rhythm related events.   Begin routine aerobic exercise for at least 30 minutes 5 days per week.  Strongly encouraged patient to maintain hydration by drinking 6-8 glasses of water a day and decrease soda/caffeine consumption.  Continue current medications.   FU in 6 MO, sooner as needed.  Thank you for allowing us to participate in the care of your patient.     Scribed for Joseph Flower MD by Javi Mccain. 2/2/2024 14:19 EST    I, Joseph Flower MD, personally performed the services described in this documentation as scribed by the above named individual in my presence, and it is both accurate and complete.  2/2/2024  14:36 EST      Please note that portions of this note may have been completed with a voice recognition program. Efforts were made to edit the dictations,  but occasionally words are mistranscribed.

## 2024-02-14 ENCOUNTER — TELEPHONE (OUTPATIENT)
Dept: CARDIOLOGY | Facility: CLINIC | Age: 44
End: 2024-02-14
Payer: COMMERCIAL

## 2024-06-26 ENCOUNTER — OFFICE VISIT (OUTPATIENT)
Dept: ENDOCRINOLOGY | Facility: CLINIC | Age: 44
End: 2024-06-26
Payer: COMMERCIAL

## 2024-06-26 VITALS
HEART RATE: 52 BPM | WEIGHT: 124.8 LBS | DIASTOLIC BLOOD PRESSURE: 72 MMHG | SYSTOLIC BLOOD PRESSURE: 116 MMHG | OXYGEN SATURATION: 99 % | BODY MASS INDEX: 22.83 KG/M2

## 2024-06-26 DIAGNOSIS — E11.65 TYPE 2 DIABETES MELLITUS WITH HYPERGLYCEMIA, WITHOUT LONG-TERM CURRENT USE OF INSULIN: Primary | ICD-10-CM

## 2024-06-26 DIAGNOSIS — I10 BENIGN HYPERTENSION: ICD-10-CM

## 2024-06-26 DIAGNOSIS — E78.5 DYSLIPIDEMIA: ICD-10-CM

## 2024-06-26 LAB
ALBUMIN SERPL-MCNC: 4.3 G/DL (ref 3.5–5.2)
ALBUMIN UR-MCNC: 1.6 MG/DL
ALBUMIN/GLOB SERPL: 1.4 G/DL
ALP SERPL-CCNC: 104 U/L (ref 39–117)
ALT SERPL W P-5'-P-CCNC: 29 U/L (ref 1–33)
ANION GAP SERPL CALCULATED.3IONS-SCNC: 11 MMOL/L (ref 5–15)
AST SERPL-CCNC: 24 U/L (ref 1–32)
BILIRUB SERPL-MCNC: 0.5 MG/DL (ref 0–1.2)
BUN SERPL-MCNC: 10 MG/DL (ref 6–20)
BUN/CREAT SERPL: 14.7 (ref 7–25)
CALCIUM SPEC-SCNC: 9.2 MG/DL (ref 8.6–10.5)
CHLORIDE SERPL-SCNC: 106 MMOL/L (ref 98–107)
CHOLEST SERPL-MCNC: 142 MG/DL (ref 0–200)
CO2 SERPL-SCNC: 22 MMOL/L (ref 22–29)
CREAT SERPL-MCNC: 0.68 MG/DL (ref 0.57–1)
CREAT UR-MCNC: 281.9 MG/DL
EGFRCR SERPLBLD CKD-EPI 2021: 109.6 ML/MIN/1.73
EXPIRATION DATE: ABNORMAL
EXPIRATION DATE: NORMAL
GLOBULIN UR ELPH-MCNC: 3.1 GM/DL
GLUCOSE BLDC GLUCOMTR-MCNC: 92 MG/DL (ref 70–130)
GLUCOSE SERPL-MCNC: 83 MG/DL (ref 65–99)
HBA1C MFR BLD: 5.9 % (ref 4.5–5.7)
HDLC SERPL-MCNC: 52 MG/DL (ref 40–60)
LDLC SERPL CALC-MCNC: 72 MG/DL (ref 0–100)
LDLC/HDLC SERPL: 1.35 {RATIO}
Lab: ABNORMAL
Lab: NORMAL
MICROALBUMIN/CREAT UR: 5.7 MG/G (ref 0–29)
POTASSIUM SERPL-SCNC: 4 MMOL/L (ref 3.5–5.2)
PROT SERPL-MCNC: 7.4 G/DL (ref 6–8.5)
SODIUM SERPL-SCNC: 139 MMOL/L (ref 136–145)
TRIGL SERPL-MCNC: 100 MG/DL (ref 0–150)
TSH SERPL DL<=0.05 MIU/L-ACNC: 1.54 UIU/ML (ref 0.27–4.2)
VLDLC SERPL-MCNC: 18 MG/DL (ref 5–40)

## 2024-06-26 PROCEDURE — 80053 COMPREHEN METABOLIC PANEL: CPT | Performed by: INTERNAL MEDICINE

## 2024-06-26 PROCEDURE — 82570 ASSAY OF URINE CREATININE: CPT | Performed by: INTERNAL MEDICINE

## 2024-06-26 PROCEDURE — 82043 UR ALBUMIN QUANTITATIVE: CPT | Performed by: INTERNAL MEDICINE

## 2024-06-26 PROCEDURE — 84443 ASSAY THYROID STIM HORMONE: CPT | Performed by: INTERNAL MEDICINE

## 2024-06-26 PROCEDURE — 80061 LIPID PANEL: CPT | Performed by: INTERNAL MEDICINE

## 2024-06-26 RX ORDER — DAPAGLIFLOZIN 5 MG/1
5 TABLET, FILM COATED ORAL DAILY
Qty: 90 TABLET | Refills: 3 | Status: SHIPPED | OUTPATIENT
Start: 2024-06-26

## 2024-06-26 RX ORDER — SEMAGLUTIDE 0.68 MG/ML
0.5 INJECTION, SOLUTION SUBCUTANEOUS WEEKLY
Qty: 9 ML | Refills: 3 | Status: SHIPPED | OUTPATIENT
Start: 2024-06-26

## 2024-06-26 RX ORDER — LISINOPRIL 10 MG/1
1 TABLET ORAL DAILY
COMMUNITY
Start: 2024-04-10

## 2024-06-26 RX ORDER — FERROUS SULFATE 325(65) MG
1 TABLET, DELAYED RELEASE (ENTERIC COATED) ORAL DAILY
COMMUNITY
Start: 2024-06-07

## 2024-06-26 RX ORDER — ERGOCALCIFEROL 1.25 MG/1
1 CAPSULE ORAL WEEKLY
COMMUNITY
Start: 2024-04-10

## 2024-06-26 NOTE — PROGRESS NOTES
Office Note      Date: 2024  Patient Name: Molly Joshua  MRN: [id number]  : 1980    Chief Complaint   Patient presents with    Diabetes       History of Present Illness:   Molly Joshua is a 44 y.o. female who presents for Diabetes type 2. Diagnosed in: 2019. Treated in past with oral agents.  She had trouble tolerating metformin.  Current treatments: farxiga and ozempic. Number of insulin shots per day: none. Checks blood sugar PRN. Has low blood sugar: no. Aspirin use: No - stopped by cardiologist.. Statin use: Yes. ACE-I/ARB use: Yes.  Change in health since last visit: none. Last eye exam: 2024.     Subjective      Diabetic Complications:  Eyes: No  Kidneys: No  Feet: No  Heart: No    Diet and Exercise:  Meals per day: 3  Minutes of exercise per week: 0 mins.    Review of Systems:   Review of Systems   Constitutional: Negative.    Cardiovascular: Negative.    Gastrointestinal:  Positive for constipation.   Endocrine: Negative.        The following portions of the patient's history were reviewed and updated as appropriate: allergies, current medications, past family history, past medical history, past social history, past surgical history, and problem list.    Objective     Visit Vitals  /72   Pulse 52   Wt 56.6 kg (124 lb 12.8 oz)   SpO2 99%   BMI 22.83 kg/m²       Physical Exam:  Physical Exam  Constitutional:       Appearance: Normal appearance.   Cardiovascular:      Pulses:           Dorsalis pedis pulses are 2+ on the right side and 2+ on the left side.        Posterior tibial pulses are 2+ on the right side and 2+ on the left side.   Feet:      Right foot:      Protective Sensation: 5 sites tested.  5 sites sensed.      Skin integrity: Dry skin present.      Toenail Condition: Right toenails are normal.      Left foot:      Protective Sensation: 5 sites tested.  5 sites sensed.      Skin integrity: Dry skin present.      Toenail Condition: Left toenails are  normal.   Neurological:      Mental Status: She is alert.         Labs:    HbA1c  Lab Results   Component Value Date    HGBA1C 5.9 (A) 06/26/2024       CMP  Lab Results   Component Value Date    GLUCOSE 76 01/09/2024    BUN 10 01/09/2024    CREATININE 0.75 01/09/2024    EGFRIFNONA 100 01/13/2022    EGFRIFAFRI 121 01/13/2022    BCR 13.3 01/09/2024    K 4.4 01/09/2024    CO2 23.0 01/09/2024    CALCIUM 10.1 01/09/2024    AST 46 (H) 01/09/2024    ALT 75 (H) 01/09/2024        Lipid Panel  Lab Results   Component Value Date    HDL 45 04/07/2023     (H) 04/07/2023    TRIG 178 (H) 04/07/2023        TSH  Lab Results   Component Value Date    TSH 1.390 04/07/2023        Hemoglobin A1C  Lab Results   Component Value Date    HGBA1C 5.9 (A) 06/26/2024        Microalbumin/Creatinine  Lab Results   Component Value Date    MALBCRERATIO  04/07/2023      Comment:      Unable to calculate    MICROALBUR <1.2 04/07/2023           Assessment / Plan      Assessment & Plan:  Diagnoses and all orders for this visit:    1. Type 2 diabetes mellitus with hyperglycemia, without long-term current use of insulin (Primary)  Assessment & Plan:  Diabetes is stable.   Continue current treatment regimen.  Diabetes will be reassessed in 6 months.    Orders:  -     POC Glycosylated Hemoglobin (Hb A1C)  -     POC Glucose, Blood  -     Comprehensive Metabolic Panel; Future  -     Lipid Panel; Future  -     Microalbumin / Creatinine Urine Ratio - Urine, Clean Catch; Future  -     TSH; Future    2. Benign hypertension  Assessment & Plan:  Hypertension is stable and controlled  Continue current treatment regimen.  Blood pressure will be reassessed in 6 months.      3. Dyslipidemia  Assessment & Plan:  Continue statin.  Check lipids today.      Other orders  -     dapagliflozin (Farxiga) 5 MG tablet tablet; Take 1 tablet by mouth Daily.  Dispense: 90 tablet; Refill: 3  -     Semaglutide,0.25 or 0.5MG/DOS, (Ozempic, 0.25 or 0.5 MG/DOSE,) 2 MG/3ML  solution pen-injector; Inject 0.5 mg under the skin into the appropriate area as directed 1 (One) Time Per Week.  Dispense: 9 mL; Refill: 3      Current Outpatient Medications   Medication Instructions    cetirizine (ZYRTEC) 10 mg, Oral, Daily    dapagliflozin (FARXIGA) 5 mg, Oral, Daily    escitalopram (LEXAPRO) 20 mg, Daily    ezetimibe (ZETIA) 10 mg, Oral, Daily    ferrous sulfate 325 (65 FE) MG EC tablet 1 tablet, Oral, Daily    flecainide (TAMBOCOR) 100 mg, Oral, 2 Times Daily    lisinopril (PRINIVIL,ZESTRIL) 10 MG tablet 1 tablet, Oral, Daily    omeprazole (PRILOSEC) 40 mg, Oral, Daily    ondansetron (ZOFRAN) 4 mg, Oral, Every 8 Hours PRN    Ozempic (0.25 or 0.5 MG/DOSE) 0.5 mg, Subcutaneous, Weekly    rosuvastatin (CRESTOR) 40 mg, Oral, Daily    vitamin D (ERGOCALCIFEROL) 1.25 MG (41861 UT) capsule capsule 1 capsule, Oral, Weekly      Return in about 6 months (around 12/26/2024) for Recheck with A1c.    Electronically signed by: Jorge Eastman MD  06/26/2024

## 2024-10-18 ENCOUNTER — OFFICE VISIT (OUTPATIENT)
Dept: CARDIOLOGY | Facility: CLINIC | Age: 44
End: 2024-10-18
Payer: COMMERCIAL

## 2024-10-18 VITALS
HEART RATE: 69 BPM | DIASTOLIC BLOOD PRESSURE: 84 MMHG | SYSTOLIC BLOOD PRESSURE: 122 MMHG | HEIGHT: 62 IN | BODY MASS INDEX: 23 KG/M2 | OXYGEN SATURATION: 98 % | WEIGHT: 125 LBS

## 2024-10-18 DIAGNOSIS — E78.5 DYSLIPIDEMIA: ICD-10-CM

## 2024-10-18 DIAGNOSIS — I10 ESSENTIAL HYPERTENSION: ICD-10-CM

## 2024-10-18 DIAGNOSIS — I49.3 PVC (PREMATURE VENTRICULAR CONTRACTION): Primary | ICD-10-CM

## 2024-10-18 PROCEDURE — 3074F SYST BP LT 130 MM HG: CPT | Performed by: INTERNAL MEDICINE

## 2024-10-18 PROCEDURE — 3079F DIAST BP 80-89 MM HG: CPT | Performed by: INTERNAL MEDICINE

## 2024-10-18 PROCEDURE — 93000 ELECTROCARDIOGRAM COMPLETE: CPT | Performed by: INTERNAL MEDICINE

## 2024-10-18 PROCEDURE — 99214 OFFICE O/P EST MOD 30 MIN: CPT | Performed by: INTERNAL MEDICINE

## 2024-10-18 PROCEDURE — 1160F RVW MEDS BY RX/DR IN RCRD: CPT | Performed by: INTERNAL MEDICINE

## 2024-10-18 PROCEDURE — 1159F MED LIST DOCD IN RCRD: CPT | Performed by: INTERNAL MEDICINE

## 2024-10-18 NOTE — PROGRESS NOTES
"[facility] Cardiology    Encounter Date: 10/18/2024    Patient ID: Molly Joshua is a 44 y.o. female.  : 1980     PCP: Bashir Das MD       Chief Complaint: PVC (premature ventricular contraction)      PROBLEM LIST:  Premature ventricular contractions: .   Hospitalized at [facility], 10/17/2013, with 1-2 week history of dizziness and near syncope, noted to have runs of wide QRS complex tachycardia felt to be left ventricular outflow tract VT.   Treated with Flecainide.   Echo during her pregnancy in  or  was reportedly unremarkable.   Echo at [facility], 10/18/2013, showed normal left ventricular size and function. Ejection fraction 55% to 60%. No significant valvular abnormalities.   Echo, 2016, showed normal LV systolic function with ejection fraction greater than 65%. Trace mitral and trace tricuspid regurgitation.  24h Holter 2016 showed sinus rhythm with very frequent PVCs (12% of QRS complexes).  Treadmill stress test, 2016: Remarkable for average exercise capacity, normal hemodynamic response. Negative test for angina or ischemic ST segment changes. No exercise-induced arrhythmias although frequent PVCs were noted at rest which improved with exercise.  EP study With identification of 2 PVCs (posterior Left ventricular wall blow mitral valve annulus- associated with NSVT, successfully ablated) second PVC felt to be deep seated and would potentially require epicardial ablation. 2016  24h Holter 2019: Average HR 68 bpm ( bpm), rare PVC's  Echo 10/11/2019: EF 60%, mild MR/TR  Echo, 2023: EF 60%. Trace to mild MR. Trace TR with normal RVSP.   Holter, 2024: SR. No significant arrhythmias. No symptoms reported.   Anxiety and panic disorder.   Chronic back pain.   \"Hypertensive heart disease\" as noted in her records. No documentation available.   Family history of coronary artery " disease, father has had stents.   Dyslipidemia.   DM2     History of Present Illness  Patient presents today for a follow-up with a history of PVCs and cardiac risk factors. Since last visit, the patient has done very well from cardiac standpoint.  Remains active and busy working full-time.  Denies current chest pain shortness of breath edema palpitations dizziness lightheadedness or syncope.  States that she has been forgetting her nocturnal dose of flecainide and only takes it once a day but has not had any problems.  Wants to know if it is okay to stay on once a day.    Allergies   Allergen Reactions    Black [place] Pollen Hives    Milk-Related Compounds Hives    Wheat Hives    Garlic Hives    Iodopropynyl Butylcarbamate [Iodopropynyl] Itching    Metformin Diarrhea and GI Intolerance         Current Outpatient Medications:     cetirizine (zyrTEC) 10 MG tablet, Take 1 tablet by mouth Daily., Disp: , Rfl:     dapagliflozin (Farxiga) 5 MG tablet tablet, Take 1 tablet by mouth Daily., Disp: 90 tablet, Rfl: 3    escitalopram (LEXAPRO) 20 MG tablet, 1 tablet Daily., Disp: , Rfl:     ezetimibe (ZETIA) 10 MG tablet, Take 1 tablet by mouth Daily., Disp: , Rfl:     flecainide (TAMBOCOR) 100 MG tablet, Take 1 tablet by mouth 2 (Two) Times a Day., Disp: 180 tablet, Rfl: 3    lisinopril (PRINIVIL,ZESTRIL) 10 MG tablet, Take 1 tablet by mouth Daily., Disp: , Rfl:     omeprazole (priLOSEC) 40 MG capsule, Take 1 capsule by mouth Daily., Disp: , Rfl:     ondansetron (ZOFRAN) 4 MG tablet, Take 1 tablet by mouth Every 8 (Eight) Hours As Needed for Nausea or Vomiting., Disp: , Rfl:     rosuvastatin (CRESTOR) 40 MG tablet, Take 1 tablet by mouth Daily., Disp: 90 tablet, Rfl: 3    Semaglutide,0.25 or 0.5MG/DOS, (Ozempic, 0.25 or 0.5 MG/DOSE,) 2 MG/3ML solution pen-injector, Inject 0.5 mg under the skin into the appropriate area as directed 1 (One) Time Per Week., Disp: 9 mL, Rfl: 3    The following portions of the patient's history  "were reviewed and updated as appropriate: allergies, current medications, past family history, past medical history, past social history, past surgical history and problem list.    ROS  Review of Systems   14 point ROS negative except for that listed in the HPI.         Objective:     /84   Pulse 69   Ht 157.5 cm (62\")   Wt 56.7 kg (125 lb)   SpO2 98%   BMI 22.86 kg/m²      Physical Exam  Constitutional: Patient appears well-developed and well-nourished.   HENT: HEENT exam unremarkable.   Neck: Neck supple. No JVD present. No carotid bruits.   Cardiovascular: Normal rate, regular rhythm and normal heart sounds. No murmur heard.   2+ symmetric pulses.   Pulmonary/Chest: Breath sounds normal. Does not exhibit tenderness.   Abdominal: Abdomen benign.   Musculoskeletal: Does not exhibit edema.   Neurological: Neurological exam unremarkable.   Vitals reviewed.    Data Review:   Lab Results   Component Value Date    GLUCOSE 83 06/26/2024    BUN 10 06/26/2024    CREATININE 0.68 06/26/2024    EGFR 109.6 06/26/2024    BCR 14.7 06/26/2024     06/26/2024    K 4.0 06/26/2024    CO2 22.0 06/26/2024    CALCIUM 9.2 06/26/2024    ALBUMIN 4.3 06/26/2024    AST 24 06/26/2024    ALT 29 06/26/2024     Lab Results   Component Value Date    CHOL 142 06/26/2024    TRIG 100 06/26/2024    HDL 52 06/26/2024    LDL 72 06/26/2024      Lab Results   Component Value Date    TSH 1.540 06/26/2024     Lab Results   Component Value Date    HGBA1C 5.9 (A) 06/26/2024          ECG 12 Lead    Date/Time: 10/18/2024 1:37 PM  Performed by: Joseph Flower MD    Authorized by: Joseph Flower MD  Comparison: compared with previous ECG from 2/2/2024  Similar to previous ECG  Rhythm: sinus rhythm  BPM: 69    Clinical impression: normal ECG  Comments: Normal QT interval.           Advance Care Planning   ACP discussion was declined by the patient. Patient does not have an advance directive, declines further assistance.           Assessment:      " Diagnosis Plan   1. PVC (premature ventricular contraction)  Inactive/controlled with once a day flecainide, continue same.  Advised not to increase the dose without discussing with us first however if she stays on once a day with controlled symptoms it is okay.        2. Essential hypertension  Well-controlled, continue lisinopril.      3. Dyslipidemia  Well-controlled, continue Crestor and Zetia.        Plan:   Stable cardiac status.  No angina or CHF symptoms, no recurrence of palpitations.  Encouraged to maintain heart healthy diet and regular exercise, avoid caffeine, maintain good hydration.  Continue current medications.   FU in 12 MO, sooner as needed.  Thank you for allowing us to participate in the care of your patient.     Joseph Flower MD, FACC, [facility]        Please note that portions of this note may have been completed with a voice recognition program. Efforts were made to edit the dictations, but occasionally words are mistranscribed.

## 2024-10-28 RX ORDER — ROSUVASTATIN CALCIUM 40 MG/1
40 TABLET, COATED ORAL DAILY
Qty: 90 TABLET | Refills: 3 | Status: SHIPPED | OUTPATIENT
Start: 2024-10-28

## 2025-01-15 ENCOUNTER — OFFICE VISIT (OUTPATIENT)
Dept: ENDOCRINOLOGY | Facility: CLINIC | Age: 45
End: 2025-01-15
Payer: COMMERCIAL

## 2025-01-15 VITALS
BODY MASS INDEX: 22.63 KG/M2 | SYSTOLIC BLOOD PRESSURE: 112 MMHG | DIASTOLIC BLOOD PRESSURE: 70 MMHG | OXYGEN SATURATION: 100 % | HEART RATE: 65 BPM | HEIGHT: 62 IN | WEIGHT: 123 LBS

## 2025-01-15 DIAGNOSIS — E78.5 DYSLIPIDEMIA: ICD-10-CM

## 2025-01-15 DIAGNOSIS — E11.65 TYPE 2 DIABETES MELLITUS WITH HYPERGLYCEMIA, WITHOUT LONG-TERM CURRENT USE OF INSULIN: Primary | ICD-10-CM

## 2025-01-15 DIAGNOSIS — E11.49 TYPE 2 DIABETES MELLITUS WITH NEUROLOGICAL MANIFESTATIONS: ICD-10-CM

## 2025-01-15 DIAGNOSIS — I10 ESSENTIAL HYPERTENSION: ICD-10-CM

## 2025-01-15 LAB
EXPIRATION DATE: NORMAL
GLUCOSE BLDC GLUCOMTR-MCNC: 101 MG/DL (ref 70–130)
Lab: NORMAL

## 2025-01-15 PROCEDURE — 3074F SYST BP LT 130 MM HG: CPT | Performed by: INTERNAL MEDICINE

## 2025-01-15 PROCEDURE — 3078F DIAST BP <80 MM HG: CPT | Performed by: INTERNAL MEDICINE

## 2025-01-15 PROCEDURE — 99214 OFFICE O/P EST MOD 30 MIN: CPT | Performed by: INTERNAL MEDICINE

## 2025-01-15 PROCEDURE — 1160F RVW MEDS BY RX/DR IN RCRD: CPT | Performed by: INTERNAL MEDICINE

## 2025-01-15 PROCEDURE — 1159F MED LIST DOCD IN RCRD: CPT | Performed by: INTERNAL MEDICINE

## 2025-01-15 PROCEDURE — 82947 ASSAY GLUCOSE BLOOD QUANT: CPT | Performed by: INTERNAL MEDICINE

## 2025-01-15 RX ORDER — BLOOD SUGAR DIAGNOSTIC
STRIP MISCELLANEOUS
Qty: 100 EACH | Refills: 3 | Status: SHIPPED | OUTPATIENT
Start: 2025-01-15

## 2025-01-15 RX ORDER — EZETIMIBE 10 MG/1
10 TABLET ORAL DAILY
Qty: 90 TABLET | Refills: 3 | Status: SHIPPED | OUTPATIENT
Start: 2025-01-15

## 2025-01-15 RX ORDER — LISINOPRIL 10 MG/1
10 TABLET ORAL DAILY
Qty: 90 TABLET | Refills: 3 | Status: SHIPPED | OUTPATIENT
Start: 2025-01-15

## 2025-01-15 RX ORDER — SEMAGLUTIDE 0.68 MG/ML
0.5 INJECTION, SOLUTION SUBCUTANEOUS WEEKLY
Qty: 3 ML | Refills: 12 | Status: SHIPPED | OUTPATIENT
Start: 2025-01-15

## 2025-01-15 NOTE — ASSESSMENT & PLAN NOTE
Diabetes is stable.  Recent A1c was 5.7%.  Continue current treatment regimen.  Diabetes will be reassessed in 6 months.   21-Dec-2019 13:17

## 2025-01-15 NOTE — PROGRESS NOTES
"     Office Note      Date: 01/15/2025  Patient Name: Molly Joshua  MRN: [id number]  : 1980    Chief Complaint   Patient presents with    Diabetes     Type 2 diabetes mellitus with hyperglycemia, without long-term current use of insulin       History of Present Illness:   Molly Joshua is a 44 y.o. female who presents for Diabetes type 2. Diagnosed in: 2019. Treated in past with oral agents.  She had trouble tolerating metformin.  Current treatments: farxiga and ozempic. Number of insulin shots per day: none. Checks blood sugar PRN. Has low blood sugar: no. Aspirin use: No - stopped by cardiologist.. Statin use: Yes. ACE-I/ARB use: Yes.  Change in health since last visit: none. Last eye exam: 2024.     Subjective      Diabetic Complications:  Eyes: No  Kidneys: No  Feet: No  Heart: No    Diet and Exercise:  Meals per day: 3  Minutes of exercise per week: 0 mins.    Review of Systems:   Review of Systems   Constitutional: Negative.    Cardiovascular: Negative.    Gastrointestinal:  Positive for constipation.   Endocrine: Negative.        The following portions of the patient's history were reviewed and updated as appropriate: allergies, current medications, past family history, past medical history, past social history, past surgical history, and problem list.    Objective     Visit Vitals  /70 (BP Location: Left arm, Patient Position: Sitting, Cuff Size: Adult)   Pulse 65   Ht 157.5 cm (62\")   Wt 55.8 kg (123 lb)   SpO2 100%   BMI 22.50 kg/m²       Physical Exam:  Physical Exam  Constitutional:       Appearance: Normal appearance.   Neurological:      Mental Status: She is alert.         Labs:    HbA1c  Lab Results   Component Value Date    HGBA1C 5.7 (H) 2024       CMP  Lab Results   Component Value Date    GLUCOSE 83 2024    BUN 10 2024    CREATININE 0.68 2024    EGFRIFNONA 100 2022    EGFRIFAFRI 121 2022    BCR 14.7 2024    K 4.0 " "06/26/2024    CO2 22.0 06/26/2024    CALCIUM 9.2 06/26/2024    AST 24 06/26/2024    ALT 29 06/26/2024        Lipid Panel  Lab Results   Component Value Date    HDL Cholesterol 52 06/26/2024    LDL Cholesterol  72 06/26/2024    LDL/HDL Ratio 1.35 06/26/2024    Triglycerides 100 06/26/2024        TSH  Lab Results   Component Value Date    TSH 1.540 06/26/2024        Hemoglobin A1C  No components found for: \"HGBA1C\"     Microalbumin/Creatinine  Lab Results   Component Value Date    MALBCRERATIO 5.7 06/26/2024    MICROALBUR 1.6 06/26/2024           Assessment / Plan      Assessment & Plan:  Diagnoses and all orders for this visit:    1. Type 2 diabetes mellitus with hyperglycemia, without long-term current use of insulin (Primary)  Assessment & Plan:  Diabetes is stable.  Recent A1c was 5.7%.  Continue current treatment regimen.  Diabetes will be reassessed in 6 months.    Orders:  -     POC Glucose, Blood    2. Essential hypertension  Assessment & Plan:  Hypertension is stable and controlled.  Continue current treatment regimen.  Blood pressure will be reassessed in 6 months.      3. Dyslipidemia  Assessment & Plan:  Continue statin and ezetimibe.  Recent lipids okay.      4. Type 2 diabetes mellitus with neurological manifestations    Other orders  -     lisinopril (PRINIVIL,ZESTRIL) 10 MG tablet; Take 1 tablet by mouth Daily.  Dispense: 90 tablet; Refill: 3  -     ezetimibe (ZETIA) 10 MG tablet; Take 1 tablet by mouth Daily.  Dispense: 90 tablet; Refill: 3  -     Semaglutide,0.25 or 0.5MG/DOS, (Ozempic, 0.25 or 0.5 MG/DOSE,) 2 MG/3ML solution pen-injector; Inject 0.5 mg under the skin into the appropriate area as directed 1 (One) Time Per Week.  Dispense: 3 mL; Refill: 12  -     glucose blood (OneTouch Verio) test strip; Use once daily; ICD-10 E11.65  Dispense: 100 each; Refill: 3      Current Outpatient Medications   Medication Instructions    cetirizine (ZYRTEC) 10 mg, Daily    dapagliflozin (FARXIGA) 5 mg, Oral, " Daily    escitalopram (LEXAPRO) 20 mg, Daily    ezetimibe (ZETIA) 10 mg, Oral, Daily    flecainide (TAMBOCOR) 100 mg, Oral, 2 Times Daily    glucose blood (OneTouch Verio) test strip Use once daily; ICD-10 E11.65    lisinopril (PRINIVIL,ZESTRIL) 10 mg, Oral, Daily    omeprazole (PRILOSEC) 40 mg, Daily    ondansetron (ZOFRAN) 4 mg, Every 8 Hours PRN    Ozempic (0.25 or 0.5 MG/DOSE) 0.5 mg, Subcutaneous, Weekly    rosuvastatin (CRESTOR) 40 mg, Oral, Daily      Return in about 6 months (around 7/15/2025) for Recheck with A1c, microalbumin, foot exam.    Electronically signed by: Jorge Eastman MD  01/15/2025

## 2025-03-17 RX ORDER — SEMAGLUTIDE 0.68 MG/ML
0.5 INJECTION, SOLUTION SUBCUTANEOUS WEEKLY
Qty: 3 ML | Refills: 3 | OUTPATIENT
Start: 2025-03-17

## 2025-03-17 NOTE — TELEPHONE ENCOUNTER
Rx Refill Note  Requested Prescriptions     Pending Prescriptions Disp Refills    Semaglutide,0.25 or 0.5MG/DOS, (Ozempic, 0.25 or 0.5 MG/DOSE,) 2 MG/3ML solution pen-injector 3 mL 3     Sig: Inject 0.5 mg under the skin into the appropriate area as directed 1 (One) Time Per Week.      Last office visit with prescribing clinician: 1/15/2025     Next office visit with prescribing clinician: 7/18/2025       Rut Murrell  03/17/25, 13:04 EDT

## 2025-03-19 ENCOUNTER — PRIOR AUTHORIZATION (OUTPATIENT)
Dept: ENDOCRINOLOGY | Facility: CLINIC | Age: 45
End: 2025-03-19
Payer: COMMERCIAL

## 2025-03-19 NOTE — TELEPHONE ENCOUNTER
Molly Joshua (Key: [id number])  PA Case ID #: [id number]  Rx #: [id number]  Need Help? Call us at [phone]  Outcome  Approved today by [place] [employer] 2017  The request has been approved. The authorization is effective from 03/19/2025 to 03/19/2026, as long as the member is enrolled in their current health plan. A written notification letter will follow with additional details.  Effective Date: 3/19/2025  Authorization Expiration Date: 3/19/2026  Drug  Farxiga 5MG tablets  ePA cloud logo  Form  [employer] [place] [employer] ePA Form 2017 NCPDP  Original Claim Info

## 2025-03-26 RX ORDER — SEMAGLUTIDE 0.68 MG/ML
0.5 INJECTION, SOLUTION SUBCUTANEOUS WEEKLY
Qty: 3 ML | Refills: 3 | OUTPATIENT
Start: 2025-03-26

## 2025-05-22 RX ORDER — SEMAGLUTIDE 0.68 MG/ML
0.5 INJECTION, SOLUTION SUBCUTANEOUS WEEKLY
Qty: 6 ML | Refills: 2 | Status: SHIPPED | OUTPATIENT
Start: 2025-05-22

## 2025-05-22 NOTE — TELEPHONE ENCOUNTER
Rx Refill Note  Requested Prescriptions     Pending Prescriptions Disp Refills    Semaglutide,0.25 or 0.5MG/DOS, (Ozempic, 0.25 or 0.5 MG/DOSE,) 2 MG/3ML solution pen-injector 3 mL 12     Sig: Inject 0.5 mg under the skin into the appropriate area as directed 1 (One) Time Per Week.          Last office visit with prescribing clinician: 1/15/2025     Next office visit with prescribing clinician: 7/18/2025         Kecia Mixon MA  05/22/25, 13:46 EDT